# Patient Record
Sex: MALE | Race: WHITE | Employment: FULL TIME | URBAN - METROPOLITAN AREA
[De-identification: names, ages, dates, MRNs, and addresses within clinical notes are randomized per-mention and may not be internally consistent; named-entity substitution may affect disease eponyms.]

---

## 2019-07-27 ENCOUNTER — HOSPITAL ENCOUNTER (EMERGENCY)
Age: 54
Discharge: HOME | End: 2019-07-27
Payer: COMMERCIAL

## 2019-07-27 VITALS
RESPIRATION RATE: 16 BRPM | HEART RATE: 98 BPM | TEMPERATURE: 97.6 F | HEIGHT: 72 IN | BODY MASS INDEX: 33.12 KG/M2 | DIASTOLIC BLOOD PRESSURE: 97 MMHG | WEIGHT: 244.49 LBS | SYSTOLIC BLOOD PRESSURE: 167 MMHG | OXYGEN SATURATION: 98 %

## 2019-07-27 DIAGNOSIS — L03.115 CELLULITIS OF RIGHT LOWER EXTREMITY: Primary | ICD-10-CM

## 2019-07-27 PROCEDURE — 6370000000 HC RX 637 (ALT 250 FOR IP): Performed by: NURSE PRACTITIONER

## 2019-07-27 PROCEDURE — 99283 EMERGENCY DEPT VISIT LOW MDM: CPT

## 2019-07-27 PROCEDURE — 96372 THER/PROPH/DIAG INJ SC/IM: CPT

## 2019-07-27 PROCEDURE — 6360000002 HC RX W HCPCS: Performed by: NURSE PRACTITIONER

## 2019-07-27 RX ORDER — CLINDAMYCIN HYDROCHLORIDE 150 MG/1
450 CAPSULE ORAL 3 TIMES DAILY
Qty: 90 CAPSULE | Refills: 0 | Status: SHIPPED | OUTPATIENT
Start: 2019-07-27 | End: 2019-08-06

## 2019-07-27 RX ORDER — CLINDAMYCIN HYDROCHLORIDE 150 MG/1
450 CAPSULE ORAL ONCE
Status: COMPLETED | OUTPATIENT
Start: 2019-07-27 | End: 2019-07-27

## 2019-07-27 RX ORDER — CEFTRIAXONE 1 G/1
1 INJECTION, POWDER, FOR SOLUTION INTRAMUSCULAR; INTRAVENOUS ONCE
Status: COMPLETED | OUTPATIENT
Start: 2019-07-27 | End: 2019-07-27

## 2019-07-27 RX ADMIN — CLINDAMYCIN HYDROCHLORIDE 450 MG: 150 CAPSULE ORAL at 21:35

## 2019-07-27 RX ADMIN — CEFTRIAXONE 1 G: 1 INJECTION, POWDER, FOR SOLUTION INTRAMUSCULAR; INTRAVENOUS at 21:35

## 2019-07-27 ASSESSMENT — PAIN SCALES - GENERAL: PAINLEVEL_OUTOF10: 0

## 2019-07-27 ASSESSMENT — ENCOUNTER SYMPTOMS: COLOR CHANGE: 1

## 2019-07-28 NOTE — ED PROVIDER NOTES
**EVALUATED BY ADVANCED PRACTICE PROVIDER**        [address] [facility]      Pt Name: Shanthi Edwards  Y:[id number]  [place] 1965  Date of evaluation: 7/27/2019  Provider: TERESA Leigh CNP      Chief Complaint:    Chief Complaint   Patient presents with   [address]     right leg pt states that he was outside area is red        Nursing Notes, Past Medical Hx, Past Surgical Hx, Social Hx, Allergies, and Family Hx were all reviewed and agreed with or any disagreements were addressed in the HPI.    HPI:  (Location, Duration, Timing, Severity,Quality, Assoc Sx, Context, Modifying factors)  This is a  48 y.o. male who presents to the emergency department today complaining of redness and warmth to the front of his right calf. He states he had cellulitis in the past.  First started yesterday. No pain. No fever. No calf swelling. No chest pain or shortness of breath. PastMedical/Surgical History:  No past medical history on file. No past surgical history on file. Medications:  Previous Medications    ATORVASTATIN (LIPITOR) 10 MG TABLET        LISINOPRIL (PRINIVIL;ZESTRIL) 5 MG TABLET        METFORMIN (GLUCOPHAGE) 1000 MG TABLET             Review of Systems:  Review of Systems   Constitutional: Negative for chills, diaphoresis and fever. Musculoskeletal: Negative. Skin: Positive for color change. Allergic/Immunologic: Negative for immunocompromised state. Neurological: Negative for weakness and numbness. Hematological: Negative for adenopathy. Psychiatric/Behavioral: Negative for confusion. All other systems reviewed and are negative. Positives and Pertinent negatives as per HPI. Except as noted above in the ROS, problem specific ROS was completed and is negative. Physical Exam:  Physical Exam   Constitutional: He is oriented to person, place, and time.  Vital signs are normal. He appears well-developed and MEDICATIONS:  Discontinued Medications    No medications on file              (Please note the MDM and HPI sections of this note were completed with a voice recognition program.  Efforts weremade to edit the dictations but occasionally words are mis-transcribed.)    Electronically signed, TERESA Oswald CNP,           TERESA Oswald CNP  07/27/19 1036

## 2020-10-31 ENCOUNTER — HOSPITAL ENCOUNTER (EMERGENCY)
Age: 55
Discharge: HOME | End: 2020-10-31
Payer: COMMERCIAL

## 2020-10-31 VITALS
TEMPERATURE: 97.8 F | HEIGHT: 72 IN | WEIGHT: 235 LBS | SYSTOLIC BLOOD PRESSURE: 131 MMHG | HEART RATE: 104 BPM | RESPIRATION RATE: 16 BRPM | DIASTOLIC BLOOD PRESSURE: 88 MMHG | OXYGEN SATURATION: 100 % | BODY MASS INDEX: 31.83 KG/M2

## 2020-10-31 PROCEDURE — 99282 EMERGENCY DEPT VISIT SF MDM: CPT

## 2020-10-31 RX ORDER — SULFAMETHOXAZOLE AND TRIMETHOPRIM 800; 160 MG/1; MG/1
1 TABLET ORAL 2 TIMES DAILY
Qty: 20 TABLET | Refills: 0 | Status: SHIPPED | OUTPATIENT
Start: 2020-10-31 | End: 2020-11-10

## 2020-10-31 SDOH — HEALTH STABILITY: MENTAL HEALTH: HOW OFTEN DO YOU HAVE A DRINK CONTAINING ALCOHOL?: NEVER

## 2020-10-31 NOTE — ED PROVIDER NOTES
[address]       Pt Name: Shaneka Calhoun  MRN: [id number]  [place] 1965  Date of evaluation: 10/31/2020  Provider: LILIAN Yen    The ED Attending Physician was available for consultation but did not see or evaluate this patient. CHIEF COMPLAINT       Chief Complaint   Patient presents with    Cellulitis     cellulitis in R lower leg that started yesterday. HISTORY OF PRESENT ILLNESS  (Location/Symptom, Timing/Onset, Context/Setting, Quality, Duration, Modifying Factors, Severity.)   Shaneka Calhoun is a 50 y.o. male who presents to the emergency department with an suspicion of cellulitis in the right lower leg. He says that yesterday he began to have some redness and warmth in the front of the lower leg yesterday, no recent trauma, and is somewhat worse today. Not significantly painful. Denies any numbness. Says he has diabetes that he believes is well controlled, no insulin use. He reports that he has had cellulitis in the leg before, about a year ago, and then again a couple months ago, each time treated with an antibiotic and then went away. Says he was feeling a little rundown yesterday but otherwise normal feelings of general illness, no fever or other recent infections. Denies any other relevant medical problems. No other complaints. Nursing Notes were reviewed and I agree. REVIEW OF SYSTEMS    (2-9 systems for level 4, 10 or more for level 5)     Constitutional:  Negative for fever, chills. Respiratory:  Negative for cough, shortness of breath. Cardiovascular:  Negative for chest pain, palpitations. Gastrointestinal:  Negative for nausea, vomiting, abdominal pain. Genitourinary:  Negative for dysuria, hematuria, flank pain, and pelvic pain. Musculoskeletal:  Negative for myalgias, arthralgias, neck pain and neck stiffness. Skin: Positive for redness and warmth in the anterior right lower leg.   Neurological: Negative for dizziness, focal weakness, numbness. Except as noted above the remainder of the review of systems was reviewed and negative. PAST MEDICAL HISTORY         Diagnosis Date    Cellulitis     Diabetes mellitus ([address]        SURGICAL HISTORY     History reviewed. No pertinent surgical history. CURRENT MEDICATIONS       Previous Medications    ATORVASTATIN (LIPITOR) 10 MG TABLET        LISINOPRIL (PRINIVIL;ZESTRIL) 5 MG TABLET        METFORMIN (GLUCOPHAGE) 1000 MG TABLET           ALLERGIES     Patient has no known allergies. FAMILY HISTORY     History reviewed. No pertinent family history. No family status information on file. SOCIAL HISTORY      reports that he has never smoked. He has never used smokeless tobacco. He reports that he does not drink alcohol or use drugs. PHYSICAL EXAM    (up to 7 for level 4, 8 or more for level 5)     ED Triage Vitals [10/31/20 0916]   BP Temp Temp Source Pulse Resp SpO2 Height Weight   137/85 97.8 °F (36.6 °C) Oral 103 14 100 % 6' (1.829 m) 235 lb (106.6 kg)       Constitutional:  Appearing well-developed and well-nourished. No distress. HENT:  Normocephalic and atraumatic. Cardiovascular:  Normal rate, regular rhythm, normal heart sounds and intact distal pulses. Pulmonary/Chest:  Effort normal and breath sounds normal. No respiratory distress. Musculoskeletal:  Normal range of motion. No edema exhibited. Neurological:  Oriented to person, place, and time. No cranial nerve deficit. Skin:  Skin is warm and dry. Not diaphoretic. Erythema and warmth noted to the anterior right lower leg, not circumferential, negative for tenderness to palpation, negative for involvement of the foot, ankle or knee. See image below. Psychiatric:  Normal mood, affect, behavior, judgment and thought content.            DIAGNOSTIC RESULTS     RADIOLOGY:   Non-plain film images such as CT, Ultrasound and MRI are read by the radiologist. Plain radiographic images are visualized and preliminarily interpreted by ILLIAN Schneider with the below findings:    None. Interpretation per the Radiologist below, if available at the time of this note:    No orders to display       LABS:  Labs Reviewed - No data to display    All other labs were within normal range or not returned as of this dictation. EMERGENCY DEPARTMENT COURSE and DIFFERENTIAL DIAGNOSIS/MDM:   Vitals:    Vitals:    10/31/20 0916   BP: 137/85   Pulse: 103   Resp: 14   Temp: 97.8 °F (36.6 °C)   TempSrc: Oral   SpO2: 100%   Weight: 235 lb (106.6 kg)   Height: 6' (1.829 m)       The patient's condition in the ED was good, the patient was afebrile and nontoxic in appearance, and the patient's physical exam was unremarkable other than for the right lower leg findings noted above, suggesting cellulitis. Patient presented with a heart rate of 103 but his heart rate normalized in the ED without intervention. Suspicion for sepsis or severe systemic infection was low. Patient is a known diabetic, not insulin-dependent. Has been treated for the cellulitis, apparently successfully, twice before. There was no indication for hospitalization or further workup. We discharged with a prescription for a course of Bactrim treatment, and he says he has an appointment with primary care in mid November. Was advised to keep this appointment or call sooner if needed, and to return to the emergency department if symptoms worsen. The patient verbalized understanding and agreement with this plan of care. The patient was advised to return to the emergency department if symptoms should significantly worsen or if new and concerning symptoms should appear. I estimate there is LOW risk for SEVERE CELLULITIS, SEPSIS NECROTIZING FASCIITIS, FRACTURE, COMPARTMENT SYNDROME, DEEP VENOUS THROMBOSIS, SEPTIC ARTHRITIS, TENDON OR NEUROVASCULAR INJURY, thus I consider the discharge disposition reasonable.     PROCEDURES:  None    FINAL IMPRESSION      1.  Cellulitis of left lower leg          DISPOSITION/PLAN   DISPOSITION Decision To Discharge 10/31/2020 09:35:15 AM      PATIENT REFERRED TO:  your family doctor or primary care provider    Go to   Keep your upcoming appointment, or call sooner if needed    [facility] Emergency Department  [address] [facility]  [phone]  Go to   If symptoms worsen      DISCHARGE MEDICATIONS:  New Prescriptions    SULFAMETHOXAZOLE-TRIMETHOPRIM (BACTRIM DS) 800-160 MG PER TABLET    Take 1 tablet by mouth 2 times daily for 10 days       (Please note that portions of this note were completed with a voice recognition program.  Efforts were made to edit the dictations but occasionally words are mis-transcribed.)    Glynn Sal, [address] [place] [address]  10/31/20 0231

## 2020-10-31 NOTE — ED NOTES
Pt states that this morning he noticed the right lower leg become slightly red and warm, he does have a history of cellulitis. NO breaking of the skin, denies any pain.      Jenna Del Cid RN  10/31/20 3426

## 2021-12-27 PROCEDURE — 99283 EMERGENCY DEPT VISIT LOW MDM: CPT

## 2021-12-27 ASSESSMENT — PAIN DESCRIPTION - LOCATION: LOCATION: LEG

## 2021-12-27 ASSESSMENT — PAIN DESCRIPTION - DESCRIPTORS: DESCRIPTORS: ACHING

## 2021-12-27 ASSESSMENT — PAIN DESCRIPTION - PAIN TYPE: TYPE: ACUTE PAIN

## 2021-12-27 ASSESSMENT — PAIN DESCRIPTION - FREQUENCY: FREQUENCY: CONTINUOUS

## 2021-12-27 ASSESSMENT — PAIN SCALES - GENERAL: PAINLEVEL_OUTOF10: 2

## 2021-12-27 ASSESSMENT — PAIN SCALES - WONG BAKER: WONGBAKER_NUMERICALRESPONSE: 2

## 2021-12-27 ASSESSMENT — PAIN DESCRIPTION - ORIENTATION: ORIENTATION: RIGHT

## 2021-12-28 ENCOUNTER — APPOINTMENT (OUTPATIENT)
Dept: ULTRASOUND IMAGING | Age: 56
End: 2021-12-28
Payer: COMMERCIAL

## 2021-12-28 ENCOUNTER — HOSPITAL ENCOUNTER (OUTPATIENT)
Age: 56
Setting detail: OBSERVATION
Discharge: HOME | End: 2021-12-29
Attending: STUDENT IN AN ORGANIZED HEALTH CARE EDUCATION/TRAINING PROGRAM | Admitting: STUDENT IN AN ORGANIZED HEALTH CARE EDUCATION/TRAINING PROGRAM
Payer: COMMERCIAL

## 2021-12-28 DIAGNOSIS — A41.9 SEPSIS DUE TO CELLULITIS (HCC): Primary | ICD-10-CM

## 2021-12-28 DIAGNOSIS — L03.90 SEPSIS DUE TO CELLULITIS (HCC): Primary | ICD-10-CM

## 2021-12-28 PROBLEM — E11.9 DM2 (DIABETES MELLITUS, TYPE 2) (HCC): Status: ACTIVE | Noted: 2021-12-28

## 2021-12-28 LAB
ANION GAP SERPL CALCULATED.3IONS-SCNC: 16 MMOL/L (ref 3–16)
ANION GAP SERPL CALCULATED.3IONS-SCNC: 16 MMOL/L (ref 3–16)
BASOPHILS ABSOLUTE: 0 K/UL (ref 0–0.2)
BASOPHILS ABSOLUTE: 0 K/UL (ref 0–0.2)
BASOPHILS RELATIVE PERCENT: 0.1 %
BASOPHILS RELATIVE PERCENT: 0.2 %
BUN BLDV-MCNC: 15 MG/DL (ref 7–20)
BUN BLDV-MCNC: 17 MG/DL (ref 7–20)
CALCIUM SERPL-MCNC: 8 MG/DL (ref 8.3–10.6)
CALCIUM SERPL-MCNC: 9 MG/DL (ref 8.3–10.6)
CHLORIDE BLD-SCNC: 102 MMOL/L (ref 99–110)
CHLORIDE BLD-SCNC: 99 MMOL/L (ref 99–110)
CO2: 16 MMOL/L (ref 21–32)
CO2: 17 MMOL/L (ref 21–32)
CREAT SERPL-MCNC: 0.6 MG/DL (ref 0.9–1.3)
CREAT SERPL-MCNC: 0.7 MG/DL (ref 0.9–1.3)
EOSINOPHILS ABSOLUTE: 0 K/UL (ref 0–0.6)
EOSINOPHILS ABSOLUTE: 0 K/UL (ref 0–0.6)
EOSINOPHILS RELATIVE PERCENT: 0 %
EOSINOPHILS RELATIVE PERCENT: 0 %
ESTIMATED AVERAGE GLUCOSE: 145.6 MG/DL
GFR AFRICAN AMERICAN: >60
GFR AFRICAN AMERICAN: >60
GFR NON-AFRICAN AMERICAN: >60
GFR NON-AFRICAN AMERICAN: >60
GLUCOSE BLD-MCNC: 110 MG/DL (ref 70–99)
GLUCOSE BLD-MCNC: 151 MG/DL (ref 70–99)
GLUCOSE BLD-MCNC: 182 MG/DL (ref 70–99)
GLUCOSE BLD-MCNC: 195 MG/DL (ref 70–99)
GLUCOSE BLD-MCNC: 201 MG/DL (ref 70–99)
GLUCOSE BLD-MCNC: 219 MG/DL (ref 70–99)
HBA1C MFR BLD: 6.7 %
HCT VFR BLD CALC: 40.7 % (ref 40.5–52.5)
HCT VFR BLD CALC: 47.9 % (ref 40.5–52.5)
HEMOGLOBIN: 14.3 G/DL (ref 13.5–17.5)
HEMOGLOBIN: 15.8 G/DL (ref 13.5–17.5)
LACTIC ACID, SEPSIS: 1.3 MMOL/L (ref 0.4–1.9)
LACTIC ACID, SEPSIS: 1.4 MMOL/L (ref 0.4–1.9)
LYMPHOCYTES ABSOLUTE: 0.3 K/UL (ref 1–5.1)
LYMPHOCYTES ABSOLUTE: 0.5 K/UL (ref 1–5.1)
LYMPHOCYTES RELATIVE PERCENT: 2 %
LYMPHOCYTES RELATIVE PERCENT: 4.3 %
MAGNESIUM: 1.7 MG/DL (ref 1.8–2.4)
MCH RBC QN AUTO: 30.4 PG (ref 26–34)
MCH RBC QN AUTO: 31.8 PG (ref 26–34)
MCHC RBC AUTO-ENTMCNC: 33 G/DL (ref 31–36)
MCHC RBC AUTO-ENTMCNC: 35 G/DL (ref 31–36)
MCV RBC AUTO: 90.8 FL (ref 80–100)
MCV RBC AUTO: 92.2 FL (ref 80–100)
MONOCYTES ABSOLUTE: 0.7 K/UL (ref 0–1.3)
MONOCYTES ABSOLUTE: 1 K/UL (ref 0–1.3)
MONOCYTES RELATIVE PERCENT: 5.4 %
MONOCYTES RELATIVE PERCENT: 6.3 %
NEUTROPHILS ABSOLUTE: 11.4 K/UL (ref 1.7–7.7)
NEUTROPHILS ABSOLUTE: 14.5 K/UL (ref 1.7–7.7)
NEUTROPHILS RELATIVE PERCENT: 90.1 %
NEUTROPHILS RELATIVE PERCENT: 91.6 %
PDW BLD-RTO: 13 % (ref 12.4–15.4)
PDW BLD-RTO: 13.3 % (ref 12.4–15.4)
PERFORMED ON: ABNORMAL
PLATELET # BLD: 147 K/UL (ref 135–450)
PLATELET # BLD: 151 K/UL (ref 135–450)
PMV BLD AUTO: 7.5 FL (ref 5–10.5)
PMV BLD AUTO: 7.6 FL (ref 5–10.5)
POTASSIUM REFLEX MAGNESIUM: 3.9 MMOL/L (ref 3.5–5.1)
POTASSIUM SERPL-SCNC: 3.7 MMOL/L (ref 3.5–5.1)
RBC # BLD: 4.48 M/UL (ref 4.2–5.9)
RBC # BLD: 5.19 M/UL (ref 4.2–5.9)
SODIUM BLD-SCNC: 132 MMOL/L (ref 136–145)
SODIUM BLD-SCNC: 134 MMOL/L (ref 136–145)
WBC # BLD: 12.7 K/UL (ref 4–11)
WBC # BLD: 15.8 K/UL (ref 4–11)

## 2021-12-28 PROCEDURE — 83036 HEMOGLOBIN GLYCOSYLATED A1C: CPT

## 2021-12-28 PROCEDURE — 87040 BLOOD CULTURE FOR BACTERIA: CPT

## 2021-12-28 PROCEDURE — 96372 THER/PROPH/DIAG INJ SC/IM: CPT

## 2021-12-28 PROCEDURE — 6370000000 HC RX 637 (ALT 250 FOR IP): Performed by: FAMILY MEDICINE

## 2021-12-28 PROCEDURE — 94760 N-INVAS EAR/PLS OXIMETRY 1: CPT

## 2021-12-28 PROCEDURE — 6360000002 HC RX W HCPCS: Performed by: FAMILY MEDICINE

## 2021-12-28 PROCEDURE — 96366 THER/PROPH/DIAG IV INF ADDON: CPT

## 2021-12-28 PROCEDURE — 83605 ASSAY OF LACTIC ACID: CPT

## 2021-12-28 PROCEDURE — 6360000002 HC RX W HCPCS: Performed by: STUDENT IN AN ORGANIZED HEALTH CARE EDUCATION/TRAINING PROGRAM

## 2021-12-28 PROCEDURE — 85025 COMPLETE CBC W/AUTO DIFF WBC: CPT

## 2021-12-28 PROCEDURE — 80048 BASIC METABOLIC PNL TOTAL CA: CPT

## 2021-12-28 PROCEDURE — 96365 THER/PROPH/DIAG IV INF INIT: CPT

## 2021-12-28 PROCEDURE — 2580000003 HC RX 258: Performed by: STUDENT IN AN ORGANIZED HEALTH CARE EDUCATION/TRAINING PROGRAM

## 2021-12-28 PROCEDURE — 36415 COLL VENOUS BLD VENIPUNCTURE: CPT

## 2021-12-28 PROCEDURE — G0378 HOSPITAL OBSERVATION PER HR: HCPCS

## 2021-12-28 PROCEDURE — 6360000002 HC RX W HCPCS: Performed by: PHYSICIAN ASSISTANT

## 2021-12-28 PROCEDURE — 96367 TX/PROPH/DG ADDL SEQ IV INF: CPT

## 2021-12-28 PROCEDURE — 76999 ECHO EXAMINATION PROCEDURE: CPT

## 2021-12-28 PROCEDURE — 6370000000 HC RX 637 (ALT 250 FOR IP): Performed by: STUDENT IN AN ORGANIZED HEALTH CARE EDUCATION/TRAINING PROGRAM

## 2021-12-28 PROCEDURE — 2580000003 HC RX 258: Performed by: PHYSICIAN ASSISTANT

## 2021-12-28 PROCEDURE — 83735 ASSAY OF MAGNESIUM: CPT

## 2021-12-28 RX ORDER — ASPIRIN 81 MG/1
81 TABLET ORAL DAILY
Status: DISCONTINUED | OUTPATIENT
Start: 2021-12-28 | End: 2021-12-29 | Stop reason: HOSPADM

## 2021-12-28 RX ORDER — ASPIRIN 81 MG/1
81 TABLET ORAL DAILY
COMMUNITY

## 2021-12-28 RX ORDER — DEXTROSE MONOHYDRATE 50 MG/ML
100 INJECTION, SOLUTION INTRAVENOUS PRN
Status: DISCONTINUED | OUTPATIENT
Start: 2021-12-28 | End: 2021-12-29 | Stop reason: HOSPADM

## 2021-12-28 RX ORDER — EMPAGLIFLOZIN 25 MG/1
25 TABLET, FILM COATED ORAL DAILY
COMMUNITY

## 2021-12-28 RX ORDER — ACETAMINOPHEN 325 MG/1
650 TABLET ORAL EVERY 6 HOURS PRN
Status: DISCONTINUED | OUTPATIENT
Start: 2021-12-28 | End: 2021-12-29 | Stop reason: HOSPADM

## 2021-12-28 RX ORDER — ONDANSETRON 2 MG/ML
4 INJECTION INTRAMUSCULAR; INTRAVENOUS EVERY 6 HOURS PRN
Status: DISCONTINUED | OUTPATIENT
Start: 2021-12-28 | End: 2021-12-29 | Stop reason: HOSPADM

## 2021-12-28 RX ORDER — NICOTINE POLACRILEX 4 MG
15 LOZENGE BUCCAL PRN
Status: DISCONTINUED | OUTPATIENT
Start: 2021-12-28 | End: 2021-12-29 | Stop reason: HOSPADM

## 2021-12-28 RX ORDER — MAGNESIUM SULFATE IN WATER 40 MG/ML
2000 INJECTION, SOLUTION INTRAVENOUS ONCE
Status: COMPLETED | OUTPATIENT
Start: 2021-12-28 | End: 2021-12-28

## 2021-12-28 RX ORDER — SODIUM CHLORIDE 9 MG/ML
25 INJECTION, SOLUTION INTRAVENOUS PRN
Status: DISCONTINUED | OUTPATIENT
Start: 2021-12-28 | End: 2021-12-29 | Stop reason: HOSPADM

## 2021-12-28 RX ORDER — 0.9 % SODIUM CHLORIDE 0.9 %
30 INTRAVENOUS SOLUTION INTRAVENOUS ONCE
Status: COMPLETED | OUTPATIENT
Start: 2021-12-28 | End: 2021-12-28

## 2021-12-28 RX ORDER — SODIUM CHLORIDE 0.9 % (FLUSH) 0.9 %
5-40 SYRINGE (ML) INJECTION EVERY 12 HOURS SCHEDULED
Status: DISCONTINUED | OUTPATIENT
Start: 2021-12-28 | End: 2021-12-29 | Stop reason: HOSPADM

## 2021-12-28 RX ORDER — 0.9 % SODIUM CHLORIDE 0.9 %
1000 INTRAVENOUS SOLUTION INTRAVENOUS ONCE
Status: COMPLETED | OUTPATIENT
Start: 2021-12-28 | End: 2021-12-28

## 2021-12-28 RX ORDER — SODIUM CHLORIDE 0.9 % (FLUSH) 0.9 %
5-40 SYRINGE (ML) INJECTION PRN
Status: DISCONTINUED | OUTPATIENT
Start: 2021-12-28 | End: 2021-12-29 | Stop reason: HOSPADM

## 2021-12-28 RX ORDER — ACETAMINOPHEN 650 MG/1
650 SUPPOSITORY RECTAL EVERY 6 HOURS PRN
Status: DISCONTINUED | OUTPATIENT
Start: 2021-12-28 | End: 2021-12-29 | Stop reason: HOSPADM

## 2021-12-28 RX ORDER — DEXTROSE MONOHYDRATE 25 G/50ML
12.5 INJECTION, SOLUTION INTRAVENOUS PRN
Status: DISCONTINUED | OUTPATIENT
Start: 2021-12-28 | End: 2021-12-29 | Stop reason: HOSPADM

## 2021-12-28 RX ORDER — ATORVASTATIN CALCIUM 10 MG/1
10 TABLET, FILM COATED ORAL NIGHTLY
Status: DISCONTINUED | OUTPATIENT
Start: 2021-12-28 | End: 2021-12-29 | Stop reason: HOSPADM

## 2021-12-28 RX ORDER — LISINOPRIL 5 MG/1
5 TABLET ORAL DAILY
Status: DISCONTINUED | OUTPATIENT
Start: 2021-12-28 | End: 2021-12-29 | Stop reason: HOSPADM

## 2021-12-28 RX ADMIN — SODIUM CHLORIDE 3273 ML: 9 INJECTION, SOLUTION INTRAVENOUS at 02:54

## 2021-12-28 RX ADMIN — CEFEPIME HYDROCHLORIDE 2000 MG: 2 INJECTION, POWDER, FOR SOLUTION INTRAVENOUS at 02:54

## 2021-12-28 RX ADMIN — SODIUM CHLORIDE 25 ML: 9 INJECTION, SOLUTION INTRAVENOUS at 10:53

## 2021-12-28 RX ADMIN — CEFAZOLIN 2000 MG: 10 INJECTION, POWDER, FOR SOLUTION INTRAVENOUS at 18:07

## 2021-12-28 RX ADMIN — CEFAZOLIN 2000 MG: 10 INJECTION, POWDER, FOR SOLUTION INTRAVENOUS at 10:54

## 2021-12-28 RX ADMIN — Medication 1250 MG: at 20:38

## 2021-12-28 RX ADMIN — ASPIRIN 81 MG: 81 TABLET, COATED ORAL at 10:48

## 2021-12-28 RX ADMIN — SODIUM CHLORIDE 1000 ML: 9 INJECTION, SOLUTION INTRAVENOUS at 02:07

## 2021-12-28 RX ADMIN — SODIUM CHLORIDE 25 ML: 9 INJECTION, SOLUTION INTRAVENOUS at 12:16

## 2021-12-28 RX ADMIN — INSULIN LISPRO 4 UNITS: 100 INJECTION, SOLUTION INTRAVENOUS; SUBCUTANEOUS at 12:17

## 2021-12-28 RX ADMIN — MAGNESIUM SULFATE HEPTAHYDRATE 2000 MG: 40 INJECTION, SOLUTION INTRAVENOUS at 12:17

## 2021-12-28 RX ADMIN — SODIUM CHLORIDE, PRESERVATIVE FREE 10 ML: 5 INJECTION INTRAVENOUS at 09:18

## 2021-12-28 RX ADMIN — ATORVASTATIN CALCIUM 10 MG: 10 TABLET, FILM COATED ORAL at 20:38

## 2021-12-28 RX ADMIN — SODIUM CHLORIDE 25 ML: 9 INJECTION, SOLUTION INTRAVENOUS at 18:04

## 2021-12-28 RX ADMIN — INSULIN LISPRO 2 UNITS: 100 INJECTION, SOLUTION INTRAVENOUS; SUBCUTANEOUS at 18:08

## 2021-12-28 RX ADMIN — LISINOPRIL 5 MG: 5 TABLET ORAL at 09:18

## 2021-12-28 RX ADMIN — VANCOMYCIN HYDROCHLORIDE 2000 MG: 1 INJECTION, POWDER, LYOPHILIZED, FOR SOLUTION INTRAVENOUS at 04:37

## 2021-12-28 RX ADMIN — ENOXAPARIN SODIUM 40 MG: 100 INJECTION SUBCUTANEOUS at 09:18

## 2021-12-28 ASSESSMENT — PAIN SCALES - WONG BAKER
WONGBAKER_NUMERICALRESPONSE: 2
WONGBAKER_NUMERICALRESPONSE: 2

## 2021-12-28 ASSESSMENT — PAIN SCALES - GENERAL
PAINLEVEL_OUTOF10: 0
PAINLEVEL_OUTOF10: 2
PAINLEVEL_OUTOF10: 2

## 2021-12-28 NOTE — ED TRIAGE NOTES
Becca Blount is a 49 y.o. male brought himself to the ER for eval of cellulitis in right lower leg. The patient states that he has a history of cellulitis and noticed that his leg was red today. His wife outlined the redness and it continued to get worse as the night continued. The patient is alert and oriented with an open and patent airway.

## 2021-12-28 NOTE — PROGRESS NOTES
Pt S/E. Pt was admitted overnight with rle cellulitis.   H&P noted and agree with the plan  Labs noted  Change abx to vanc, ancef as he has no risk factors for pseudomonas    Cont jardiance for dm, hold metformin  Ssi, ccd  Check hba1c  Replace Mg  Follow up on us

## 2021-12-28 NOTE — H&P
Hospital Medicine History & Physical      PCP: Bailey Guillory    Date of Admission: 12/28/2021    Date of Service: Pt seen/examined on 12/28/2021 and  Placed in Observation. Chief Complaint: Right lower extremity erythema and warmth, lightheadedness, chills      History Of Present Illness: The patient is a 49 y.o. male with past medical history of hypertension, hyperlipidemia, and diabetes who presents to [facility] with concern for worsening right lower extremity erythema and warmth that began without any notable trigger since the last 24 to 36 hours. Patient notes that yesterday morning he started noticing some slight erythema to the right lower extremity but had not been seeking any major medical attention until later. It was noted that prior to the erythema becoming worse his wife had outlined the initial erythema to continue to monitor it. As the rest of the next 24 hours went on patient and wife noted that the erythema was worsening. He also admits to having some slight level of lightheadedness and generalized weakness as well as some chills throughout the rest of the day. He did feel as though the infection was getting worse. He denies any areas of drainage to the right lower extremity. No other areas of erythema are noted throughout the rest of his body according to his report. He denies other symptoms of dizziness, syncope, chest pain, shortness of breath, dysuria, blood in urine/stool/sputum, abdominal pain/nausea/vomiting/diarrhea. Past Medical History:        Diagnosis Date    Cellulitis     Diabetes mellitus ([facility] [address]        Past Surgical History:    History reviewed. No pertinent surgical history. Medications Prior to Admission:    Prior to Admission medications    Medication Sig Start Date End Date Taking?  Authorizing Provider empagliflozin (JARDIANCE) 25 MG tablet Take 25 mg by mouth daily   Yes Historical Provider, MD   aspirin EC 81 MG EC tablet Take 81 mg by mouth daily   Yes Historical Provider, MD   metFORMIN (GLUCOPHAGE) 1000 MG tablet Take 1,000 mg by mouth 2 times daily  11/29/15  Yes Historical Provider, MD   lisinopril (PRINIVIL;ZESTRIL) 5 MG tablet  11/29/15  Yes Historical Provider, MD   atorvastatin (LIPITOR) 10 MG tablet  11/29/15  Yes Historical Provider, MD       Allergies:  Patient has no known allergies. Social History:  The patient currently lives home    TOBACCO:   reports that he has never smoked. He has never used smokeless tobacco.  ETOH:   reports no history of alcohol use. Family History:  Reviewed in detail and negative for DM, Early CAD, Cancer, CVA. Positive as follows:    History reviewed. No pertinent family history. REVIEW OF SYSTEMS:   as noted in the HPI. All other systems reviewed and negative. PHYSICAL EXAM:    BP (!) 155/62   Pulse 98   Temp 99.4 °F (37.4 °C) (Oral)   Resp 18   Ht 6' (1.829 m)   Wt 240 lb 8.4 oz (109.1 kg)   SpO2 96%   BMI 32.62 kg/m²     General appearance: No apparent distress appears stated age and cooperative. HEENT Normal cephalic, atraumatic without obvious deformity. Pupils equal, round, and reactive to light. Extra ocular muscles intact. Conjunctivae/corneas clear. Neck: Supple, No jugular venous distention/bruits. Trachea midline without thyromegaly or adenopathy with full range of motion. Lungs: Clear to auscultation, bilaterally without Rales/Wheezes/Rhonchi with good respiratory effort. Heart: Regular rate and rhythm with Normal S1/S2 without murmurs, rubs or gallops, point of maximum impulse non-displaced  Abdomen: Soft, non-tender or non-distended without rigidity or guarding and positive bowel sounds all four quadrants.   Extremities: Right lower extremity demonstrates a very large amorphous area of erythema and warmth, none of this is seen on the left lower extremity, no areas of drainage are noted to the right lower extremity suggest an abscess at this time  Skin: As noted above, otherwise no other rashes  Neurologic: Alert and oriented X 3, neurovascularly intact with sensory/motor intact upper extremities/lower extremities, bilaterally. Cranial nerves: II-XII intact, grossly non-focal.  Mental status: Alert, oriented, thought content appropriate. Capillary Refill: Acceptable  < 3 seconds  Peripheral Pulses: +3 Easily felt, not easily obliterated with pressure        CBC   Recent Labs     12/28/21  0134 12/28/21  0604   WBC 15.8* 12.7*   HGB 15.8 14.3   HCT 47.9 40.7    147      RENAL  Recent Labs     12/28/21  0134   *   K 3.9   CL 99   CO2 17*   BUN 17   CREATININE 0.6*     LFT'S  No results for input(s): AST, ALT, ALB, BILIDIR, BILITOT, ALKPHOS in the last 72 hours. COAG  No results for input(s): INR in the last 72 hours. CARDIAC ENZYMES  No results for input(s): CKTOTAL, CKMB, CKMBINDEX, TROPONINI in the last 72 hours.     U/A:  No results found for: NITRITE, COLORU, WBCUA, RBCUA, MUCUS, BACTERIA, CLARITYU, SPECGRAV, LEUKOCYTESUR, BLOODU, GLUCOSEU, AMORPHOUS    ABG  No results found for: OPD4ETO, BEART, E1MTAJLC, PHART, THGBART, LTA6UDS, PO2ART, JFB8MGW        Active Hospital Problems    Diagnosis Date Noted    Cellulitis [L03.90] 12/28/2021    Sepsis due to cellulitis ([facility] [address] [L03.90, A41.9] 12/28/2021    DM2 (diabetes mellitus, type 2) ([facility] [address] [E11.9] 12/28/2021         PHYSICIANS CERTIFICATION:    I certify that Sade Gustafson is expected to be hospitalized for last than 2 midnights based on the following assessment and plan:      ASSESSMENT/PLAN:  · Sepsis  · Cellulitis  · Type 2 diabetes    Plan:  · Patient started on vancomycin and cefepime for broad-spectrum coverage of cellulitis to the right lower extremity  · Obtaining ultrasound of the right lower extremity erythema to evaluate for abscess  · Sliding-scale insulin coverage  · Restart patient's home medications  · Repeat labs in the morning        DVT Prophylaxis: Lovenox  Diet: ADULT DIET; Regular; 4 carb choices (60 gm/meal); Low Sodium (2 gm)  Code Status: Full Code  PT/OT Eval Status: Ambulatory    Dispo -pending clinical course       Regulo Gan DO    Thank you Kim Claire for the opportunity to be involved in this patient's care. If you have any questions or concerns please feel free to contact me at [phone].

## 2021-12-28 NOTE — PROGRESS NOTES
Patient resting quietly in bed. Alert and oriented. Denies pain. Breakfast ordered. Morning medications given without difficulty. VSS. Patient ambulates independently. Cellulitis marked on RLE. Call light and belongings within reach. Will continue to monitor.      Electronically signed by Nayeli Price RN on 12/28/2021 at 11:30 AM

## 2021-12-28 NOTE — PROGRESS NOTES
4 Eyes Admission Assessment     I agree as the admission nurse that 2 RN's have performed a thorough Head to Toe Skin Assessment on the patient. ALL assessment sites listed below have been assessed on admission. Areas assessed by both nurses:   [x]   Head, Face, and Ears   [x]   Shoulders, Back, and Chest  [x]   Arms, Elbows, and Hands   [x]   Coccyx, Sacrum, and Ischum  [x]   Legs, Feet, and Heels  Cellulitis redness to right lower leg      Does the Patient have Skin Breakdown?   No         Yony Prevention initiated:  No   Wound Care Orders initiated:  No      [facility] nurse consulted for Pressure Injury (Stage 3,4, Unstageable, DTI, NWPT, and Complex wounds):  No      Nurse 1 eSignature: Electronically signed by Missy Cutler RN on 12/28/21 at 7:32 AM EST    **SHARE this note so that the co-signing nurse is able to place an eSignature**    Nurse 2 eSignature: Electronically signed by Siria Hernandez RN on 12/28/21 at 7:33 AM EST

## 2021-12-28 NOTE — PLAN OF CARE
Problem: Pain:  Goal: Pain level will decrease  Description: Pain level will decrease  Outcome: Ongoing  Note: Educated patient on pain management. Will assess patients pain level per unit protocol, and provide pain management measures as needed.    Electronically signed by Mera Olmedo RN on 12/28/2021 at 11:27 AM

## 2021-12-28 NOTE — ED PROVIDER NOTES
[facility] Emergency Department    CHIEF COMPLAINT  Cellulitis (right leg)      SHARED SERVICE VISIT  Evaluated by ANTONIO. My supervising physician was available for consultation. HISTORY OF PRESENT ILLNESS  Dolly Oshea is a 49 y.o. male history of diabetes as well as recurrent cellulitis of the lower extremity presents emergency department for evaluation of cellulitis that began this afternoon. Patient states that around 6 PM he circled the area of redness and had however double since that time. Patient reports having cellulitis at the same area in the past however no recent antibiotics. No fevers or chills. No chest pain difficulty breathing. No other complaints, modifying factors or associated symptoms. Nursing notes reviewed. Past Medical History:   Diagnosis Date    Cellulitis     Diabetes mellitus ([facility] [address]      History reviewed. No pertinent surgical history. History reviewed. No pertinent family history. Social History     Socioeconomic History    Marital status:      Spouse name: Not on file    Number of children: Not on file    Years of education: Not on file    Highest education level: Not on file   Occupational History    Not on file   Tobacco Use    Smoking status: Never Smoker    Smokeless tobacco: Never Used   Vaping Use    Vaping Use: Not on file   Substance and Sexual Activity    Alcohol use: Never    Drug use: Never    Sexual activity: Not on file   Other Topics Concern    Not on file   Social History Narrative    Not on file     Social Determinants of Health     Financial Resource Strain:     Difficulty of Paying Living Expenses: Not on file   Food Insecurity:     Worried About [address] in the Last Year: Not on file    Solomon of Food in the Last Year: Not on file   Transportation Needs:     Lack of Transportation (Medical): Not on file    Lack of Transportation (Non-Medical):  Not on file   Physical Activity:     Days of Exercise per Week: Not on file    Minutes of Exercise per Session: Not on file   Stress:     Feeling of Stress : Not on file   Social Connections:     Frequency of Communication with Friends and Family: Not on file    Frequency of Social Gatherings with Friends and Family: Not on file    Attends [employer] Services: Not on file    Active Member of [address] [place] [address] or Organizations: Not on file    Attends Club or Organization Meetings: Not on file    Marital Status: Not on file   Intimate Partner Violence:     Fear of Current or Ex-Partner: Not on file    Emotionally Abused: Not on file    Physically Abused: Not on file    Sexually Abused: Not on file   Housing Stability:     Unable to Pay for Housing in the Last Year: Not on file    Number of [place] in the Last Year: Not on file    Unstable Housing in the Last Year: Not on file     Current Facility-Administered Medications   Medication Dose Route Frequency Provider Last Rate Last Admin    sodium chloride flush 0.9 % injection 5-40 mL  5-40 mL IntraVENous 2 times per day [facility]   10 mL at 12/28/21 0918    sodium chloride flush 0.9 % injection 5-40 mL  5-40 mL IntraVENous PRN [facility], DO        0.9 % sodium chloride infusion  25 mL IntraVENous PRN [facility],  mL/hr at 12/28/21 1804 25 mL at 12/28/21 1804    enoxaparin (LOVENOX) injection 40 mg  40 mg SubCUTAneous Daily [facility]   40 mg at 12/28/21 0918    acetaminophen (TYLENOL) tablet 650 mg  650 mg Oral Q6H [facility]N [facility]        Or    acetaminophen (TYLENOL) suppository 650 mg  650 mg Rectal Q6H PRN [facility]        ondansetron (ZOFRAN) injection 4 mg  4 mg IntraVENous Q6H PRN [facility]        lisinopril (PRINIVIL;ZESTRIL) tablet 5 mg  5 mg Oral Daily [facility]   5 mg at 12/28/21 0918    atorvastatin (LIPITOR) tablet 10 mg  10 mg Oral Nightly [facility]        glucose (GLUTOSE) 40 % oral gel 15 g 15 g Oral PRN Regulo M Elvia, DO        dextrose 50 % IV solution  12.5 g IntraVENous PRN Regulo M [facility], DO        glucagon (rDNA) injection 1 mg  1 mg IntraMUSCular PRN Regulo M El[facility], DO        dextrose 5 % solution  100 mL/hr IntraVENous PRN Regulo M [facility], DO        vancomycin (VANCOCIN) 1250 mg in dextrose 5 % 250 mL IVPB  1,250 mg IntraVENous Q12H Regulo M Elvia, DO        vancomycin (VANCOCIN) intermittent dosing (placeholder)   Other RX Placeholder Regulo Pasty Mola, DO        ceFAZolin (ANCEF) 2000 mg in dextrose 5 % 100 mL IVPB  2,000 mg IntraVENous Q8H Veena R Hurst,  mL/hr at 12/28/21 1807 2,000 mg at 12/28/21 1807    aspirin EC tablet 81 mg  81 mg Oral Daily Veena R Hurst, DO   81 mg at 12/28/21 1048    empagliflozin (JARDIANCE) tablet TABS 25 mg  25 mg Oral Daily Veena R Hurst, DO   25 mg at 12/28/21 1801    insulin lispro (HUMALOG) injection vial 0-12 Units  0-12 Units SubCUTAneous TID WC Veena R Hurst, DO   4 Units at 12/28/21 1217    insulin lispro (HUMALOG) injection vial 0-6 Units  0-6 Units SubCUTAneous Nightly Veena R Hurst, DO         No Known Allergies    REVIEW OF SYSTEMS  10 systems reviewed, pertinent positives per HPI otherwise noted to be negative    PHYSICAL EXAM  /72   Pulse 102   Temp 99.4 °F (37.4 °C) (Oral)   Resp 13   Ht 6' (1.829 m)   Wt 240 lb 8.4 oz (109.1 kg)   SpO2 94%   BMI 32.62 kg/m²   GENERAL APPEARANCE: Awake and alert. Cooperative. HEAD: Normocephalic. Atraumatic. EYES: EOM's grossly intact. ENT: Mucous membranes are moist.   NECK: Supple. HEART: RRR. No murmurs. LUNGS: Respirations unlabored. CTAB. Good air exchange. Speaking comfortably in full sentences. EXTREMITIES: No peripheral edema. Moves all extremities equally. All extremities neurovascularly intact. See below  SKIN: Increased warmth and erythema expanding past the previously drawn david. No open wounds or disruption of the skin. NEUROLOGICAL: Alert and oriented.  CN's [facility] [place]ccarthy [place]    Phone [phone]   POCT GLUCOSE - Abnormal; Notable for the following components:    POC Glucose 182 (*)     All other components within normal limits    Narrative:     Performed at:  [address] [place]    Phone [phone]   POCT GLUCOSE - Abnormal; Notable for the following components:    POC Glucose 219 (*)     All other components within normal limits    Narrative:     Performed at:  [address] [place]    Phone [phone]   POCT GLUCOSE - Abnormal; Notable for the following components:    POC Glucose 151 (*)     All other components within normal limits    Narrative:     Performed at:  [address] [email or web address] [place]    Phone [phone]   CULTURE, BLOOD 1   CULTURE, BLOOD 2   LACTATE, SEPSIS    Narrative:     Performed at:  [address] [place] [place]    Phone [phone]   LACTATE, SEPSIS    Narrative:     Performed at:  [address] [email or web address] [place]    Phone [phone]   HEMOGLOBIN A1C    Narrative:     Collection has been rescheduled by [facility] at 12/28/2021 09:24 Reason: add   on  Performed at:  [address] [place] [place]    Phone [phone]   CBC WITH AUTO DIFFERENTIAL   BASIC METABOLIC PANEL   MAGNESIUM   POCT GLUCOSE   POCT GLUCOSE   POCT GLUCOSE   POCT GLUCOSE       PROCEDURES  Unless otherwise noted below, none  Procedures       CRITICAL CARE TIME  The total critical care time spent while evaluating and treating this patient was 0 minutes. This excludes time spent doing separately billable procedures.   This includes time at the bedside, data interpretation, medication management, obtaining critical history from collateral sources if the patient is unable to provide it directly, and physician consultation. Specifics of interventions taken and potentially life-threatening diagnostic considerations are listed above in the medical decision making. MDM  MDM  81-year-old male history of diabetes as well as recurrent lower extremity cellulitis presents emergency department for evaluation of sudden onset and rapidly progressing cellulitis in his right lower extremity. Onset was this afternoon and since 6 PM it has over doubled in size. On arrival to the ED patient was tachycardic at a rate of 118 bpm however afebrile. Lab work demonstrated leukocytosis at 15.8 confirming his sepsis. Lactic acid however was within normal limits. Patient started on IV fluids as well as broad-spectrum antibiotics. We will plan to request admission to the hospital service given patient's increased risk of outpatient failure. Given the recurrent cellulitis in that particular area increases his odds ratio of outpatient failure. Also patient is at increased risk of decompensation given his immunocompromise state with diabetes as well as his septic appearance. DISPOSITION  Admitted    CLINICAL IMPRESSION  1.  Sepsis due to cellulitis [facility])            Krao Dejesus PA-C  12/28/21 0066

## 2021-12-28 NOTE — PROGRESS NOTES
Medication Reconciliation    List of medications patient is currently taking is complete. Source of information: 1. RN Interview                                      2. EPIC records                                      3. Dispense history      Allergies  Patient has no known allergies.

## 2021-12-28 NOTE — ED NOTES
Ramiro line around redness on leg to have idea home much the redness is spreading.      Tito Gates, RN  12/28/21 4302

## 2021-12-29 VITALS
WEIGHT: 240.52 LBS | RESPIRATION RATE: 18 BRPM | DIASTOLIC BLOOD PRESSURE: 78 MMHG | SYSTOLIC BLOOD PRESSURE: 122 MMHG | HEIGHT: 72 IN | BODY MASS INDEX: 32.58 KG/M2 | TEMPERATURE: 98 F | OXYGEN SATURATION: 96 % | HEART RATE: 83 BPM

## 2021-12-29 LAB
ANION GAP SERPL CALCULATED.3IONS-SCNC: 12 MMOL/L (ref 3–16)
BASOPHILS ABSOLUTE: 0 K/UL (ref 0–0.2)
BASOPHILS RELATIVE PERCENT: 0.3 %
BUN BLDV-MCNC: 15 MG/DL (ref 7–20)
CALCIUM SERPL-MCNC: 8.3 MG/DL (ref 8.3–10.6)
CHLORIDE BLD-SCNC: 101 MMOL/L (ref 99–110)
CO2: 19 MMOL/L (ref 21–32)
CREAT SERPL-MCNC: <0.5 MG/DL (ref 0.9–1.3)
EOSINOPHILS ABSOLUTE: 0.1 K/UL (ref 0–0.6)
EOSINOPHILS RELATIVE PERCENT: 1.3 %
GFR AFRICAN AMERICAN: >60
GFR NON-AFRICAN AMERICAN: >60
GLUCOSE BLD-MCNC: 135 MG/DL (ref 70–99)
GLUCOSE BLD-MCNC: 87 MG/DL (ref 70–99)
GLUCOSE BLD-MCNC: 90 MG/DL (ref 70–99)
HCT VFR BLD CALC: 40.5 % (ref 40.5–52.5)
HEMOGLOBIN: 14.1 G/DL (ref 13.5–17.5)
LYMPHOCYTES ABSOLUTE: 1.1 K/UL (ref 1–5.1)
LYMPHOCYTES RELATIVE PERCENT: 15 %
MAGNESIUM: 2.3 MG/DL (ref 1.8–2.4)
MCH RBC QN AUTO: 31.4 PG (ref 26–34)
MCHC RBC AUTO-ENTMCNC: 34.9 G/DL (ref 31–36)
MCV RBC AUTO: 90 FL (ref 80–100)
MONOCYTES ABSOLUTE: 0.8 K/UL (ref 0–1.3)
MONOCYTES RELATIVE PERCENT: 11.7 %
NEUTROPHILS ABSOLUTE: 5.1 K/UL (ref 1.7–7.7)
NEUTROPHILS RELATIVE PERCENT: 71.7 %
PDW BLD-RTO: 13.1 % (ref 12.4–15.4)
PERFORMED ON: ABNORMAL
PERFORMED ON: NORMAL
PLATELET # BLD: 128 K/UL (ref 135–450)
PMV BLD AUTO: 7.7 FL (ref 5–10.5)
POTASSIUM SERPL-SCNC: 3.6 MMOL/L (ref 3.5–5.1)
RBC # BLD: 4.5 M/UL (ref 4.2–5.9)
SODIUM BLD-SCNC: 132 MMOL/L (ref 136–145)
WBC # BLD: 7.1 K/UL (ref 4–11)

## 2021-12-29 PROCEDURE — 80048 BASIC METABOLIC PNL TOTAL CA: CPT

## 2021-12-29 PROCEDURE — 94760 N-INVAS EAR/PLS OXIMETRY 1: CPT

## 2021-12-29 PROCEDURE — 6370000000 HC RX 637 (ALT 250 FOR IP): Performed by: FAMILY MEDICINE

## 2021-12-29 PROCEDURE — 36415 COLL VENOUS BLD VENIPUNCTURE: CPT

## 2021-12-29 PROCEDURE — 83735 ASSAY OF MAGNESIUM: CPT

## 2021-12-29 PROCEDURE — G0378 HOSPITAL OBSERVATION PER HR: HCPCS

## 2021-12-29 PROCEDURE — 96366 THER/PROPH/DIAG IV INF ADDON: CPT

## 2021-12-29 PROCEDURE — 6370000000 HC RX 637 (ALT 250 FOR IP): Performed by: STUDENT IN AN ORGANIZED HEALTH CARE EDUCATION/TRAINING PROGRAM

## 2021-12-29 PROCEDURE — 2580000003 HC RX 258: Performed by: STUDENT IN AN ORGANIZED HEALTH CARE EDUCATION/TRAINING PROGRAM

## 2021-12-29 PROCEDURE — 85025 COMPLETE CBC W/AUTO DIFF WBC: CPT

## 2021-12-29 PROCEDURE — 96372 THER/PROPH/DIAG INJ SC/IM: CPT

## 2021-12-29 PROCEDURE — 6360000002 HC RX W HCPCS: Performed by: FAMILY MEDICINE

## 2021-12-29 PROCEDURE — 6360000002 HC RX W HCPCS: Performed by: STUDENT IN AN ORGANIZED HEALTH CARE EDUCATION/TRAINING PROGRAM

## 2021-12-29 RX ORDER — CEPHALEXIN 500 MG/1
500 CAPSULE ORAL 4 TIMES DAILY
Qty: 28 CAPSULE | Refills: 0 | Status: SHIPPED | OUTPATIENT
Start: 2021-12-29 | End: 2022-01-05

## 2021-12-29 RX ORDER — SACCHAROMYCES BOULARDII 250 MG
250 CAPSULE ORAL 2 TIMES DAILY
Qty: 30 CAPSULE | Refills: 0 | Status: SHIPPED | OUTPATIENT
Start: 2021-12-29 | End: 2022-01-13

## 2021-12-29 RX ORDER — DOXYCYCLINE HYCLATE 100 MG
100 TABLET ORAL 2 TIMES DAILY
Qty: 14 TABLET | Refills: 0 | Status: SHIPPED | OUTPATIENT
Start: 2021-12-29 | End: 2022-01-05

## 2021-12-29 RX ADMIN — CEFAZOLIN 2000 MG: 10 INJECTION, POWDER, FOR SOLUTION INTRAVENOUS at 01:37

## 2021-12-29 RX ADMIN — Medication 1250 MG: at 09:36

## 2021-12-29 RX ADMIN — ASPIRIN 81 MG: 81 TABLET, COATED ORAL at 09:34

## 2021-12-29 RX ADMIN — SODIUM CHLORIDE, PRESERVATIVE FREE 10 ML: 5 INJECTION INTRAVENOUS at 09:44

## 2021-12-29 RX ADMIN — ENOXAPARIN SODIUM 40 MG: 100 INJECTION SUBCUTANEOUS at 09:34

## 2021-12-29 RX ADMIN — CEFAZOLIN 2000 MG: 10 INJECTION, POWDER, FOR SOLUTION INTRAVENOUS at 11:42

## 2021-12-29 RX ADMIN — LISINOPRIL 5 MG: 5 TABLET ORAL at 09:34

## 2021-12-29 ASSESSMENT — PAIN SCALES - GENERAL: PAINLEVEL_OUTOF10: 0

## 2021-12-29 NOTE — PLAN OF CARE
Problem: Pain:  Goal: Pain level will decrease  Description: Pain level will decrease  12/28/2021 2055 by Mckayla Segovia RN  Outcome: Ongoing  Note: Patient will continue to have no pain  12/28/2021 1126 by Dede Gonzalez RN  Outcome: Ongoing  Note: Educated patient on pain management. Will assess patients pain level per unit protocol, and provide pain management measures as needed.    Electronically signed by Dede Gonzalez RN on 12/28/2021 at 11:27 AM   Goal: Control of acute pain  Description: Control of acute pain  Outcome: Ongoing  Note: Patient will continue to have no pain  Goal: Control of chronic pain  Description: Control of chronic pain  Outcome: Ongoing  Note: Patient will continue to have no pain

## 2021-12-29 NOTE — DISCHARGE SUMMARY
Hospital Medicine Discharge Summary    Patient: Trevor Moody     Gender: male  : 1965   Age: 49 y.o. MRN: [id number]    Code Status: Full Code     Primary Care Provider: Dorcas Linares Date: 2021   Discharge Date:   2021    Admitting Physician: Nancy Kumar DO  Discharge Physician: Silvino Krueger DO     Discharge Diagnoses: Active Hospital Problems    Diagnosis Date Noted    Cellulitis [L03.90] 2021    Sepsis due to cellulitis (HCC) [L03.90, A41.9] 2021    DM2 (diabetes mellitus, type 2) ([facility] [address] [E11.9] 2021       Hospital Course: A 48 yo male was admitted with cellulitis. Work up completed, and improved with treatment as below. was discharged today in stable condition. Cellulitis - improving   - ct/us without abscess  - leukocytosis resolved  - blood cx ngtd  - vanc, ancef while in pt, discharged today with doxy and keflex      Diabetes  - stable, hba1c is 6.7  - cont home PO meds    Disposition:  Home    Exam:     /78   Pulse 83   Temp 98 °F (36.7 °C) (Oral)   Resp 18   Ht 6' (1.829 m)   Wt 240 lb 8.4 oz (109.1 kg)   SpO2 96%   BMI 32.62 kg/m²     General appearance:  No apparent distress, appears stated age and cooperative. HEENT:  Normal cephalic, atraumatic without obvious deformity. Pupils equal, round, and reactive to light. Extra ocular muscles intact. Conjunctivae/corneas clear. Neck: Supple, with full range of motion. No jugular venous distention. Trachea midline. Respiratory:  Normal respiratory effort. Clear to auscultation, bilaterally without Rales/Wheezes/Rhonchi. Cardiovascular:  Regular rate and rhythm with normal S1/S2 without murmurs, rubs or gallops. Abdomen: Soft, non-tender, non-distended with normal bowel sounds. Musculoskeletal:  No clubbing, cyanosis or edema bilaterally. Full range of motion without deformity.   Skin: Skin color, texture, turgor normal.  Improving erythema, no edema or tenderness  Neurologic:  Neurovascularly intact without any focal sensory/motor deficits. Cranial nerves: II-XII intact, grossly non-focal.  Psychiatric:  Alert and oriented, thought content appropriate, normal insight    Consults:     IP CONSULT TO HOSPITALIST  PHARMACY TO DOSE VANCOMYCIN    Labs: For convenience and continuity at follow-up the following most recent labs are provided:    Lab Results   Component Value Date    WBC 7.1 12/29/2021    HGB 14.1 12/29/2021    HCT 40.5 12/29/2021    MCV 90.0 12/29/2021     12/29/2021     12/29/2021    K 3.6 12/29/2021    K 3.9 12/28/2021     12/29/2021    CO2 19 12/29/2021    BUN 15 12/29/2021    CREATININE <0.5 12/29/2021    CALCIUM 8.3 12/29/2021     No results found for: INR    Radiology:  US SOFT TISSUE LIMITED AREA   Final Result   Soft tissue edema in the volar aspect of the calf. No focal abscess   identified.              Discharge Medications:   Current Discharge Medication List      START taking these medications    Details   cephALEXin (KEFLEX) 500 MG capsule Take 1 capsule by mouth 4 times daily for 7 days  Qty: 28 capsule, Refills: 0      doxycycline hyclate (VIBRA-TABS) 100 MG tablet Take 1 tablet by mouth 2 times daily for 7 days  Qty: 14 tablet, Refills: 0      saccharomyces boulardii (FLORASTOR) 250 MG capsule Take 1 capsule by mouth 2 times daily for 15 days  Qty: 30 capsule, Refills: 0           Current Discharge Medication List        Current Discharge Medication List      CONTINUE these medications which have NOT CHANGED    Details   empagliflozin (JARDIANCE) 25 MG tablet Take 25 mg by mouth daily      aspirin EC 81 MG EC tablet Take 81 mg by mouth daily      metFORMIN (GLUCOPHAGE) 1000 MG tablet Take by mouth See Admin Instructions 500 mg with Breakfast and 1000 mg with Dinner  Refills: 3      lisinopril (PRINIVIL;ZESTRIL) 5 MG tablet Take 5 mg by mouth daily   Refills: 3      atorvastatin (LIPITOR) 10 MG tablet Take 10 mg by mouth daily   Refills: 3           Current Discharge Medication List          Follow-up appointments:  one week    Provider Follow-up:    pcp    Condition at Discharge:  Stable    The patient was seen and examined on day of discharge and this discharge summary is in conjunction with any daily progress note from day of discharge. Time Spent on discharge is 45 minutes  in the examination, evaluation, counseling and review of medications and discharge plan. Signed:    Francis Garza DO   12/29/2021      Thank you Alberto Nuñez for the opportunity to be involved in this patient's care. If you have any questions or concerns please feel free to contact me at [phone].

## 2021-12-29 NOTE — PROGRESS NOTES
Patient alert and oriented, discharged to home with documented belongings. IV removed with no complications. Transported out of hospitals by wheelchair. Reviewed discharge, follow up, and medication instructions with patient and family member, patient and family member verbalized understanding.  Electronically signed by Batsheva Harp RN on 12/29/2021 at 5:58 PM

## 2021-12-29 NOTE — PLAN OF CARE
Problem: Pain:  Goal: Pain level will decrease  Description: Pain level will decrease  12/29/2021 0722 by Suad De Leon RN  Outcome: Ongoing  Note: Pt assessed for pain. Pt in pain and assessed with 0-10 pain rating scale. Pt given prescribed analgesic for pain. (See eMar) Pt satisfied with pain relief thus far. Will reassess and continue to monitor. Problem: Pain:  Goal: Control of acute pain  Description: Control of acute pain  12/29/2021 0722 by Suad De Leon RN  Outcome: Ongoing  Note: Pt assessed for pain. Pt in pain and assessed with 0-10 pain rating scale. Pt given prescribed analgesic for pain. (See eMar) Pt satisfied with pain relief thus far. Will reassess and continue to monitor. Problem: Pain:  Goal: Control of chronic pain  Description: Control of chronic pain  12/29/2021 0722 by Suad De Leon RN  Outcome: Ongoing  Note: Pt assessed for pain. Pt in pain and assessed with 0-10 pain rating scale. Pt given prescribed analgesic for pain. (See eMar) Pt satisfied with pain relief thus far. Will reassess and continue to monitor.

## 2021-12-29 NOTE — PROGRESS NOTES
Patient resting in bed this shift, no acute complaints at this time, intermittent IV abx infusions running. RLE red and warm, outlined. All needs met at this time, will continue to monitor.

## 2021-12-29 NOTE — PROGRESS NOTES
Patient is alert & oriented x4, UAL, 2/4 bed rails up, bed in lowest position, fall precautions in place, call light within reach. Morning medications given without complications. Intermittent antibiotics infusing. Redness on R leg remains within marker outline. Will cont to monitor and reassess.  Electronically signed by Massiel Anthony RN on 12/29/2021 at 9:52 AM

## 2022-01-01 LAB
BLOOD CULTURE, ROUTINE: NORMAL
CULTURE, BLOOD 2: NORMAL

## 2022-02-27 ENCOUNTER — HOSPITAL ENCOUNTER (INPATIENT)
Age: 57
LOS: 2 days | Discharge: HOME | DRG: 872 | End: 2022-03-02
Attending: STUDENT IN AN ORGANIZED HEALTH CARE EDUCATION/TRAINING PROGRAM | Admitting: STUDENT IN AN ORGANIZED HEALTH CARE EDUCATION/TRAINING PROGRAM
Payer: COMMERCIAL

## 2022-02-27 ENCOUNTER — APPOINTMENT (OUTPATIENT)
Dept: GENERAL RADIOLOGY | Age: 57
DRG: 872 | End: 2022-02-27
Payer: COMMERCIAL

## 2022-02-27 DIAGNOSIS — A41.9 SEPTICEMIA (HCC): ICD-10-CM

## 2022-02-27 DIAGNOSIS — L08.9 DIABETIC FOOT INFECTION (HCC): Primary | ICD-10-CM

## 2022-02-27 DIAGNOSIS — E11.628 DIABETIC FOOT INFECTION (HCC): Primary | ICD-10-CM

## 2022-02-27 LAB
A/G RATIO: 1.4 (ref 1.1–2.2)
ALBUMIN SERPL-MCNC: 4.1 G/DL (ref 3.4–5)
ALP BLD-CCNC: 92 U/L (ref 40–129)
ALT SERPL-CCNC: 22 U/L (ref 10–40)
ANION GAP SERPL CALCULATED.3IONS-SCNC: 14 MMOL/L (ref 3–16)
AST SERPL-CCNC: 19 U/L (ref 15–37)
BASOPHILS ABSOLUTE: 0.1 K/UL (ref 0–0.2)
BASOPHILS RELATIVE PERCENT: 0.4 %
BILIRUB SERPL-MCNC: 1.3 MG/DL (ref 0–1)
BUN BLDV-MCNC: 15 MG/DL (ref 7–20)
CALCIUM SERPL-MCNC: 8.9 MG/DL (ref 8.3–10.6)
CHLORIDE BLD-SCNC: 96 MMOL/L (ref 99–110)
CO2: 19 MMOL/L (ref 21–32)
CREAT SERPL-MCNC: 0.5 MG/DL (ref 0.9–1.3)
EOSINOPHILS ABSOLUTE: 0.1 K/UL (ref 0–0.6)
EOSINOPHILS RELATIVE PERCENT: 0.6 %
GFR AFRICAN AMERICAN: >60
GFR NON-AFRICAN AMERICAN: >60
GLUCOSE BLD-MCNC: 244 MG/DL (ref 70–99)
HCT VFR BLD CALC: 44 % (ref 40.5–52.5)
HEMOGLOBIN: 15.3 G/DL (ref 13.5–17.5)
LACTIC ACID: 1.4 MMOL/L (ref 0.4–2)
LYMPHOCYTES ABSOLUTE: 0.6 K/UL (ref 1–5.1)
LYMPHOCYTES RELATIVE PERCENT: 4.4 %
MCH RBC QN AUTO: 30.6 PG (ref 26–34)
MCHC RBC AUTO-ENTMCNC: 34.7 G/DL (ref 31–36)
MCV RBC AUTO: 88.3 FL (ref 80–100)
MONOCYTES ABSOLUTE: 0.9 K/UL (ref 0–1.3)
MONOCYTES RELATIVE PERCENT: 6.7 %
NEUTROPHILS ABSOLUTE: 12.3 K/UL (ref 1.7–7.7)
NEUTROPHILS RELATIVE PERCENT: 87.9 %
PDW BLD-RTO: 13.4 % (ref 12.4–15.4)
PLATELET # BLD: 179 K/UL (ref 135–450)
PMV BLD AUTO: 7.4 FL (ref 5–10.5)
POTASSIUM REFLEX MAGNESIUM: 3.9 MMOL/L (ref 3.5–5.1)
RBC # BLD: 4.99 M/UL (ref 4.2–5.9)
SODIUM BLD-SCNC: 129 MMOL/L (ref 136–145)
TOTAL PROTEIN: 7 G/DL (ref 6.4–8.2)
WBC # BLD: 14 K/UL (ref 4–11)

## 2022-02-27 PROCEDURE — 96365 THER/PROPH/DIAG IV INF INIT: CPT

## 2022-02-27 PROCEDURE — 83605 ASSAY OF LACTIC ACID: CPT

## 2022-02-27 PROCEDURE — 93005 ELECTROCARDIOGRAM TRACING: CPT | Performed by: PHYSICIAN ASSISTANT

## 2022-02-27 PROCEDURE — 90471 IMMUNIZATION ADMIN: CPT | Performed by: PHYSICIAN ASSISTANT

## 2022-02-27 PROCEDURE — 2580000003 HC RX 258: Performed by: PHYSICIAN ASSISTANT

## 2022-02-27 PROCEDURE — 87040 BLOOD CULTURE FOR BACTERIA: CPT

## 2022-02-27 PROCEDURE — 36415 COLL VENOUS BLD VENIPUNCTURE: CPT

## 2022-02-27 PROCEDURE — 90715 TDAP VACCINE 7 YRS/> IM: CPT | Performed by: PHYSICIAN ASSISTANT

## 2022-02-27 PROCEDURE — 80053 COMPREHEN METABOLIC PANEL: CPT

## 2022-02-27 PROCEDURE — 85025 COMPLETE CBC W/AUTO DIFF WBC: CPT

## 2022-02-27 PROCEDURE — 73630 X-RAY EXAM OF FOOT: CPT

## 2022-02-27 PROCEDURE — 99284 EMERGENCY DEPT VISIT MOD MDM: CPT

## 2022-02-27 PROCEDURE — 87150 DNA/RNA AMPLIFIED PROBE: CPT

## 2022-02-27 PROCEDURE — 6370000000 HC RX 637 (ALT 250 FOR IP): Performed by: PHYSICIAN ASSISTANT

## 2022-02-27 PROCEDURE — 6360000002 HC RX W HCPCS: Performed by: PHYSICIAN ASSISTANT

## 2022-02-27 RX ORDER — 0.9 % SODIUM CHLORIDE 0.9 %
30 INTRAVENOUS SOLUTION INTRAVENOUS ONCE
Status: COMPLETED | OUTPATIENT
Start: 2022-02-27 | End: 2022-02-27

## 2022-02-27 RX ORDER — ACETAMINOPHEN 325 MG/1
650 TABLET ORAL ONCE
Status: COMPLETED | OUTPATIENT
Start: 2022-02-27 | End: 2022-02-27

## 2022-02-27 RX ADMIN — ACETAMINOPHEN 650 MG: 325 TABLET ORAL at 21:51

## 2022-02-27 RX ADMIN — MEROPENEM 1000 MG: 1 INJECTION, POWDER, FOR SOLUTION INTRAVENOUS at 21:56

## 2022-02-27 RX ADMIN — SODIUM CHLORIDE 3228 ML: 9 INJECTION, SOLUTION INTRAVENOUS at 21:52

## 2022-02-27 RX ADMIN — TETANUS TOXOID, REDUCED DIPHTHERIA TOXOID AND ACELLULAR PERTUSSIS VACCINE, ADSORBED 0.5 ML: 5; 2.5; 8; 8; 2.5 SUSPENSION INTRAMUSCULAR at 22:06

## 2022-02-27 ASSESSMENT — PAIN SCALES - GENERAL: PAINLEVEL_OUTOF10: 0

## 2022-02-28 PROBLEM — A41.9 SEPSIS (HCC): Status: ACTIVE | Noted: 2022-02-28

## 2022-02-28 PROBLEM — E11.65 UNCONTROLLED TYPE 2 DIABETES MELLITUS WITH HYPERGLYCEMIA (HCC): Status: ACTIVE | Noted: 2022-02-28

## 2022-02-28 PROBLEM — L03.116 CELLULITIS OF FOOT, LEFT: Status: ACTIVE | Noted: 2022-02-28

## 2022-02-28 LAB
EKG ATRIAL RATE: 108 BPM
EKG DIAGNOSIS: NORMAL
EKG P AXIS: 67 DEGREES
EKG P-R INTERVAL: 182 MS
EKG Q-T INTERVAL: 336 MS
EKG QRS DURATION: 100 MS
EKG QTC CALCULATION (BAZETT): 450 MS
EKG R AXIS: -16 DEGREES
EKG T AXIS: 62 DEGREES
EKG VENTRICULAR RATE: 108 BPM
ESTIMATED AVERAGE GLUCOSE: 174.3 MG/DL
GLUCOSE BLD-MCNC: 119 MG/DL (ref 70–99)
GLUCOSE BLD-MCNC: 137 MG/DL (ref 70–99)
GLUCOSE BLD-MCNC: 150 MG/DL (ref 70–99)
HBA1C MFR BLD: 7.7 %
LACTIC ACID, SEPSIS: 1 MMOL/L (ref 0.4–1.9)
LACTIC ACID, SEPSIS: 1.2 MMOL/L (ref 0.4–1.9)
LACTIC ACID: 0.8 MMOL/L (ref 0.4–2)
LACTIC ACID: 1.2 MMOL/L (ref 0.4–2)
PERFORMED ON: ABNORMAL
REPORT: NORMAL

## 2022-02-28 PROCEDURE — 6370000000 HC RX 637 (ALT 250 FOR IP): Performed by: NURSE PRACTITIONER

## 2022-02-28 PROCEDURE — 2580000003 HC RX 258: Performed by: NURSE PRACTITIONER

## 2022-02-28 PROCEDURE — 87040 BLOOD CULTURE FOR BACTERIA: CPT

## 2022-02-28 PROCEDURE — 1200000000 HC SEMI PRIVATE

## 2022-02-28 PROCEDURE — 93010 ELECTROCARDIOGRAM REPORT: CPT | Performed by: INTERNAL MEDICINE

## 2022-02-28 PROCEDURE — 6360000002 HC RX W HCPCS: Performed by: FAMILY MEDICINE

## 2022-02-28 PROCEDURE — 2580000003 HC RX 258: Performed by: FAMILY MEDICINE

## 2022-02-28 PROCEDURE — 36415 COLL VENOUS BLD VENIPUNCTURE: CPT

## 2022-02-28 PROCEDURE — 83036 HEMOGLOBIN GLYCOSYLATED A1C: CPT

## 2022-02-28 PROCEDURE — 6360000002 HC RX W HCPCS: Performed by: NURSE PRACTITIONER

## 2022-02-28 PROCEDURE — 6370000000 HC RX 637 (ALT 250 FOR IP): Performed by: PODIATRIST

## 2022-02-28 PROCEDURE — 6360000002 HC RX W HCPCS: Performed by: PHYSICIAN ASSISTANT

## 2022-02-28 PROCEDURE — 83605 ASSAY OF LACTIC ACID: CPT

## 2022-02-28 PROCEDURE — 2580000003 HC RX 258: Performed by: PHYSICIAN ASSISTANT

## 2022-02-28 RX ORDER — ASPIRIN 81 MG/1
81 TABLET ORAL DAILY
Status: DISCONTINUED | OUTPATIENT
Start: 2022-02-28 | End: 2022-03-02 | Stop reason: HOSPADM

## 2022-02-28 RX ORDER — ATORVASTATIN CALCIUM 10 MG/1
10 TABLET, FILM COATED ORAL DAILY
Status: DISCONTINUED | OUTPATIENT
Start: 2022-02-28 | End: 2022-03-02 | Stop reason: HOSPADM

## 2022-02-28 RX ORDER — NICOTINE POLACRILEX 4 MG
15 LOZENGE BUCCAL PRN
Status: DISCONTINUED | OUTPATIENT
Start: 2022-02-28 | End: 2022-03-02 | Stop reason: HOSPADM

## 2022-02-28 RX ORDER — DEXTROSE MONOHYDRATE 50 MG/ML
100 INJECTION, SOLUTION INTRAVENOUS PRN
Status: DISCONTINUED | OUTPATIENT
Start: 2022-02-28 | End: 2022-03-02 | Stop reason: HOSPADM

## 2022-02-28 RX ORDER — LISINOPRIL 5 MG/1
5 TABLET ORAL DAILY
Status: DISCONTINUED | OUTPATIENT
Start: 2022-02-28 | End: 2022-03-02 | Stop reason: HOSPADM

## 2022-02-28 RX ORDER — SODIUM CHLORIDE 0.9 % (FLUSH) 0.9 %
5-40 SYRINGE (ML) INJECTION PRN
Status: DISCONTINUED | OUTPATIENT
Start: 2022-02-28 | End: 2022-03-02 | Stop reason: HOSPADM

## 2022-02-28 RX ORDER — SODIUM CHLORIDE 9 MG/ML
25 INJECTION, SOLUTION INTRAVENOUS PRN
Status: DISCONTINUED | OUTPATIENT
Start: 2022-02-28 | End: 2022-03-02 | Stop reason: HOSPADM

## 2022-02-28 RX ORDER — ACETAMINOPHEN 325 MG/1
650 TABLET ORAL EVERY 6 HOURS PRN
Status: DISCONTINUED | OUTPATIENT
Start: 2022-02-28 | End: 2022-03-02 | Stop reason: HOSPADM

## 2022-02-28 RX ORDER — ACETAMINOPHEN 650 MG/1
650 SUPPOSITORY RECTAL EVERY 6 HOURS PRN
Status: DISCONTINUED | OUTPATIENT
Start: 2022-02-28 | End: 2022-03-02 | Stop reason: HOSPADM

## 2022-02-28 RX ORDER — SODIUM CHLORIDE 0.9 % (FLUSH) 0.9 %
5-40 SYRINGE (ML) INJECTION EVERY 12 HOURS SCHEDULED
Status: DISCONTINUED | OUTPATIENT
Start: 2022-02-28 | End: 2022-03-02 | Stop reason: HOSPADM

## 2022-02-28 RX ORDER — DEXTROSE MONOHYDRATE 25 G/50ML
12.5 INJECTION, SOLUTION INTRAVENOUS PRN
Status: DISCONTINUED | OUTPATIENT
Start: 2022-02-28 | End: 2022-03-02 | Stop reason: HOSPADM

## 2022-02-28 RX ADMIN — ASPIRIN 81 MG: 81 TABLET, COATED ORAL at 19:59

## 2022-02-28 RX ADMIN — VANCOMYCIN HYDROCHLORIDE 1500 MG: 1 INJECTION, POWDER, LYOPHILIZED, FOR SOLUTION INTRAVENOUS at 00:13

## 2022-02-28 RX ADMIN — LISINOPRIL 5 MG: 5 TABLET ORAL at 19:59

## 2022-02-28 RX ADMIN — SODIUM CHLORIDE 25 ML: 9 INJECTION, SOLUTION INTRAVENOUS at 09:34

## 2022-02-28 RX ADMIN — AMPICILLIN AND SULBACTAM 3000 MG: 1; 2 INJECTION, POWDER, FOR SOLUTION INTRAMUSCULAR; INTRAVENOUS at 20:05

## 2022-02-28 RX ADMIN — ENOXAPARIN SODIUM 40 MG: 100 INJECTION SUBCUTANEOUS at 09:32

## 2022-02-28 RX ADMIN — CEFEPIME HYDROCHLORIDE 2000 MG: 2 INJECTION, POWDER, FOR SOLUTION INTRAVENOUS at 09:35

## 2022-02-28 RX ADMIN — ATORVASTATIN CALCIUM 10 MG: 10 TABLET, FILM COATED ORAL at 19:59

## 2022-02-28 RX ADMIN — INSULIN LISPRO 2 UNITS: 100 INJECTION, SOLUTION INTRAVENOUS; SUBCUTANEOUS at 09:41

## 2022-02-28 RX ADMIN — SODIUM CHLORIDE 25 ML: 9 INJECTION, SOLUTION INTRAVENOUS at 12:46

## 2022-02-28 RX ADMIN — Medication 1250 MG: at 12:47

## 2022-02-28 RX ADMIN — SODIUM CHLORIDE, PRESERVATIVE FREE 10 ML: 5 INJECTION INTRAVENOUS at 09:32

## 2022-02-28 RX ADMIN — Medication: at 15:13

## 2022-02-28 ASSESSMENT — PAIN DESCRIPTION - LOCATION: LOCATION: FOOT

## 2022-02-28 ASSESSMENT — ENCOUNTER SYMPTOMS
VOMITING: 0
SHORTNESS OF BREATH: 0
COLOR CHANGE: 1

## 2022-02-28 ASSESSMENT — PAIN - FUNCTIONAL ASSESSMENT: PAIN_FUNCTIONAL_ASSESSMENT: ACTIVITIES ARE NOT PREVENTED

## 2022-02-28 ASSESSMENT — PAIN DESCRIPTION - ORIENTATION: ORIENTATION: LEFT

## 2022-02-28 ASSESSMENT — PAIN DESCRIPTION - DESCRIPTORS: DESCRIPTORS: NUMBNESS

## 2022-02-28 ASSESSMENT — PAIN DESCRIPTION - PAIN TYPE: TYPE: ACUTE PAIN

## 2022-02-28 ASSESSMENT — PAIN DESCRIPTION - FREQUENCY: FREQUENCY: CONTINUOUS

## 2022-02-28 ASSESSMENT — PAIN DESCRIPTION - ONSET: ONSET: ON-GOING

## 2022-02-28 ASSESSMENT — PAIN DESCRIPTION - PROGRESSION: CLINICAL_PROGRESSION: GRADUALLY WORSENING

## 2022-02-28 ASSESSMENT — PAIN SCALES - GENERAL: PAINLEVEL_OUTOF10: 2

## 2022-02-28 NOTE — PROGRESS NOTES
Comprehensive Nutrition Assessment    Type and Reason for Visit:  Initial,Positive Nutrition Screen    Nutrition Recommendations/Plan:   Encouraged pt too contact Dietitian should any questions arise regarding diet    Nutrition Assessment:  Pt triggered a positive screen for altered skin integrity. PMH includes DM. Pt adm r/t left red/swollen foot. Found to have cellulitis and sepsis. Diet adv to [place] [address] (3). Pt reported and records confirm a good po intake. Pt stated that appetite has been down for the past few days, but declined need for supplementation at this time. Pt also declined ed needs. Will continue to follow. Malnutrition Assessment:  Malnutrition Status:  No malnutrition    Context:  Acute Illness       Nutrition Related Findings:  Noted A1C on 12/28/21 at 6.7. Noted +1 edema to BLE. Wounds:   (Noted cellulitis to left foot)       Current Nutrition Therapies:    ADULT DIET; Regular; 3 carb choices (45 gm/meal)    Anthropometric Measures:  · Height: 6' (182.9 cm)  · Current Body Weight: 237 lb (107.5 kg)   · Admission Body Weight: 237 lb (107.5 kg)    · Ideal Body Weight: 178 lbs; % Ideal Body Weight 133.1 %   · BMI: 32.1  · BMI Categories: Obese Class 1 (BMI 30.0-34. 9)       Nutrition Diagnosis:   · No nutrition diagnosis at this time related to   as evidenced by        Nutrition Interventions:   Food and/or Nutrient Delivery:  Continue Current Diet  Nutrition Education/Counseling:  Education declined   Coordination of Nutrition Care:  Continue to monitor while inpatient    Goals:  consume >/= to 50%       Nutrition Monitoring and Evaluation:   Behavioral-Environmental Outcomes:  None Identified   Food/Nutrient Intake Outcomes:  Food and Nutrient Intake  Physical Signs/Symptoms Outcomes:  Biochemical Data,Constipation,Diarrhea,Fluid Status or Edema,Skin,Weight     Discharge Planning:    No discharge needs at this time     Electronically signed by Alisha Coats RD, LD on 2/28/22 at 1:53 PM EST    Contact: [phone]

## 2022-02-28 NOTE — PLAN OF CARE
Problem: Falls - Risk of:  Goal: Will remain free from falls  Description: Will remain free from falls  Outcome: Ongoing  Note: Fall risk assessment completed. Fall precautions in place. Call light within reach. Pt educated on calling for assistance before getting up. Walkway free of clutter. Will continue to monitor. Goal: Absence of physical injury  Description: Absence of physical injury  Outcome: Ongoing  Note: Pt is free of injury. No injury noted. Fall precautions in place. Call light within reach. Will monitor. Problem: Pain:  Goal: Pain level will decrease  Description: Pain level will decrease  Outcome: Ongoing  Goal: Control of acute pain  Description: Control of acute pain  Outcome: Ongoing  Note: Pt assessed for pain. Pt in pain and assessed with 0-10 pain rating scale. Pt given prescribed analgesic for pain. (See eMar) Pt satisfied with pain relief thus far. Will reassess and continue to monitor.      Goal: Control of chronic pain  Description: Control of chronic pain  Outcome: Ongoing   Electronically signed by Savanna Roy RN on 2/28/2022 at 6:45 AM

## 2022-02-28 NOTE — PLAN OF CARE
Problem: Falls - Risk of:  Goal: Will remain free from falls  Description: Will remain free from falls  2/28/2022 0737 by Lacy Mckee RN  Outcome: Ongoing  Note: Fall risk assessment completed. Fall precautions in place. Bed in lowest position, wheels locked, bed/chair exit alarm in place, call light within reach, and non skid footwear on. Walkway free of clutter. Pt alert and oriented and able to make needs known. Pt educated to use call light when needing to get up, and pt utilizes call light to make needs known. Will continue to monitor. Problem: Falls - Risk of:  Goal: Absence of physical injury  Description: Absence of physical injury  2/28/2022 0737 by Lacy Mckee RN  Outcome: Ongoing  Note: Pt absent from physical injury      Problem: Pain:  Goal: Pain level will decrease  Description: Pain level will decrease  2/28/2022 0737 by Lacy Mckee RN  Outcome: Ongoing  Note: Assessing and monitoring pt's pain. PRN pain medication being given if ordered. Educating pt on nonpharmacologic interventions for pain control. Will continue to monitor and reassess. Problem: Pain:  Goal: Control of acute pain  Description: Control of acute pain  2/28/2022 0737 by Lacy Mckee RN  Outcome: Ongoing  Note: Assessing and monitoring pt's pain. PRN pain medication being given if ordered. Educating pt on nonpharmacologic interventions for pain control. Will continue to monitor and reassess. Problem: Pain:  Goal: Control of chronic pain  Description: Control of chronic pain  2/28/2022 0737 by Lacy Mckee RN  Outcome: Ongoing  Note: Assessing and monitoring pt's pain. PRN pain medication being given if ordered. Educating pt on nonpharmacologic interventions for pain control. Will continue to monitor and reassess.

## 2022-02-28 NOTE — PROGRESS NOTES
Medication Reconciliation     List of medications patient is currently taking is complete. Source of information:   1. Conversation with patient at bedside  2. EPIC records        Notes regarding home medications:  1. Patient received all of his morning home medications today.   Denies other otc/herbal use  Brandyn Hernandez, Pharmacy Intern 2/27/2022 10:13 PM

## 2022-02-28 NOTE — PROGRESS NOTES
Pt arrived to floor from ER at [id number] stretcher. Pt oriented to room, call light, policies and procedures, the menu and ordering. Call light within reach. Bed in lowest position, bed alarm on, and wheels locked. Pt verbalized understanding. No complaints, questions or concerns at this time.   Electronically signed by Aneesh Slade RN on 2/28/2022 at 6:46 AM

## 2022-02-28 NOTE — CONSULTS
Department of Podiatry Consult Note  Jacky Naidu. Andi Gold DPM Attending       Reason for Consult:  LEFT sub 1st ulceration with cellulitis  Requesting Physician:  Franklin Leo    CHIEF COMPLAINT:  Ulceration of LEFT sub 1st MTPJ with extendning cellulitis to LEFT calf    HISTORY OF PRESENT ILLNESS:                The patient is a 56 y.o. male with significant past medical history of Diabetes with peripheral neuropathy who is consulted for open wound of LEFT sub 1st met-phalangeal joint of approximately 1 month's duration. Patient relates he began a work-out routine and noted blister to LEFT plantar foot that worsened to point of drainage, chills and malaise over last 3 days    Past Medical History:        Diagnosis Date    Cellulitis     Diabetes mellitus ([address]      Past Surgical History:    History reviewed. No pertinent surgical history.   Current Medications:    Current Facility-Administered Medications: aspirin EC tablet 81 mg, 81 mg, Oral, Daily  atorvastatin (LIPITOR) tablet 10 mg, 10 mg, Oral, Daily  lisinopril (PRINIVIL;ZESTRIL) tablet 5 mg, 5 mg, Oral, Daily  sodium chloride flush 0.9 % injection 5-40 mL, 5-40 mL, IntraVENous, 2 times per day  sodium chloride flush 0.9 % injection 5-40 mL, 5-40 mL, IntraVENous, PRN  0.9 % sodium chloride infusion, 25 mL, IntraVENous, PRN  enoxaparin (LOVENOX) injection 40 mg, 40 mg, SubCUTAneous, Daily  acetaminophen (TYLENOL) tablet 650 mg, 650 mg, Oral, Q6H PRN **OR** acetaminophen (TYLENOL) suppository 650 mg, 650 mg, Rectal, Q6H PRN  glucose (GLUTOSE) 40 % oral gel 15 g, 15 g, Oral, PRN  dextrose 50 % IV solution, 12.5 g, IntraVENous, PRN  glucagon (rDNA) injection 1 mg, 1 mg, IntraMUSCular, PRN  dextrose 5 % solution, 100 mL/hr, IntraVENous, PRN  insulin lispro (HUMALOG) injection vial 0-12 Units, 0-12 Units, SubCUTAneous, TID WC  insulin lispro (HUMALOG) injection vial 0-6 Units, 0-6 Units, SubCUTAneous, Nightly  medihoney wound/burn dressing gel, , Topical, Daily  ampicillin-sulbactam (UNASYN) 3000 mg ivpb minibag, 3,000 mg, IntraVENous, Q6H  Allergies:   Testosterone  Social History:    TOBACCO:  Never used tobacco  ETOH:  Never drank alcohol  INSTRUMENTAL ACTIVITIES OF DAILY LIVING:  Patient is able to perform all instrumental activities of daily living. MARITAL STATUS:    Patient currently lives with family wife Bernie Ochoa  Family History:   History reviewed. No pertinent family history. REVIEW OF SYSTEMS:    CONSTITUTIONAL:  Resolving chills  INTEGUMENT/BREAST:  positive for skin color change, changes in lesion and changes in hair  ENDOCRINE:  positive for diabetic symptoms including neither foot ulcerations nor skin dryness nor neuropathy  MUSCULOSKELETAL:  positive for  joint swelling and decreased range of motion  NEUROLOGICAL:  positive for gait problems and numbness  PHYSICAL EXAM:      Vitals:    BP (!) 149/65   Pulse 96   Temp 99 °F (37.2 °C) (Oral)   Resp 16   Ht 6' (1.829 m)   Wt 237 lb 3.4 oz (107.6 kg)   SpO2 97%   BMI 32.17 kg/m²     LABS:   Recent Labs     02/27/22 2123   WBC 14.0*   HGB 15.3   HCT 44.0        Recent Labs     02/27/22 2123   *   K 3.9   CL 96*   CO2 19*   BUN 15   CREATININE 0.5*     Recent Labs     02/27/22 2123   PROT 7.0       LOWER EXTREMITY EXAMINATION   SKIN:    LEFT plantar foot with FULL thickness ulceration of 2.3 x 2.6 cm with depth of sub Q exposure with streaming pink erythema of dorsal foot to mid-calf. No active drainage, no purulence        NEUROLOGIC:    Pain sensation isnot significantly changed Bilateral.  Light touch is decreasedLeft. positive history of paresthesia Bilateral. negativehistory of burning Bilateral. Deep tendon reflexes are 2 to include patellar and achilles Bilateral. [place]--Melyssa 5.07 monofilament sensitivity is abnormal 2 to 5 spots tested Bilateral.    VASCULAR:    bilaterally Dorsalis pedis is 2. bilaterally Posterior tibial pulse is 2. 1+ edema left.  none

## 2022-02-28 NOTE — PROGRESS NOTES
Dr. Delaney Segovia made aware of positive blood culture.    Electronically signed by Sumaya Mai RN on 2/28/2022 at 1:01 PM

## 2022-02-28 NOTE — PROGRESS NOTES
4 Eyes Skin Assessment     NAME:  Alexander Gomez  YOB: 1965  MEDICAL RECORD NUMBER:  [id number]    The patient is being assess for  Admission    I agree that 2 RN's have performed a thorough Head to Toe Skin Assessment on the patient. ALL assessment sites listed below have been assessed. Areas assessed by both nurses:    Head, Face, Ears, Shoulders, Back, Chest, Arms, Elbows, Hands, Sacrum. Buttock, Coccyx, Ischium and Legs. Feet and Heels        Does the Patient have a Wound?  Other wound on left foot       Yony Prevention initiated:  Yes   Wound Care Orders initiated:  Yes per podiatry    Pressure Injury (Stage 3,4, Unstageable, DTI, NWPT, and Complex wounds) if present place consult order under [de-identified] No    New and Established Ostomies if present place consult order under : No      Nurse 1 eSignature: Electronically signed by Tate Lambert RN on 2/28/22 at 1:05 PM EST    **SHARE this note so that the co-signing nurse is able to place an eSignature**    Nurse 2 eSignature: Electronically signed by Ramirez Fournier RN on 2/28/22 at 6:21 PM EST

## 2022-02-28 NOTE — ED PROVIDER NOTES
[address] [facility]      Pt Name: Renee Mabry  MRN: [id number]  [place] 1965  Date of evaluation: 2/27/2022  Provider: LILIAN Galaviz    This patient was not seen and evaluated by the attending physician. CHIEF COMPLAINT       Chief Complaint   Patient presents with    Other     Pt states that he thinks he has cellulitis in his left foot because he has chills. Had his Covid booster on Friday. Denies foot pain, redness or swelling       CRITICAL CARE TIME   I performed a total Critical Care time of  35 minutes, excluding separately reportable procedures. There was a high probability of clinically significant/life threatening deterioration in the patient's condition which required my urgent intervention. Not limited to multiple reexaminations, discussions with attending physician and consultants. HISTORY OF PRESENT ILLNESS  (Location/Symptom, Timing/Onset, Context/Setting, Quality, Duration, Modifying Factors, Severity.)   Renee Mabry is a 56 y.o. male who presents to the emergency department with a foot infection on the left and a fever. He tells me that he has been dealing with the wound that initially started as a blister at the base of his left great toe for couple weeks. He felt like it was getting better. He tells me that he got his Covid booster on Friday and felt poorly on Saturday but then started to feel better until 5 PM today when he got chills and fever and tells me he feels weak. He took Aleve about 2 hours ago. He does not currently have a podiatrist to follow-up with. He has history of diabetes on oral medications including Metformin and Jardiance. Denies vomiting. Nursing Notes were reviewed and I agree. REVIEW OF SYSTEMS    (2-9 systems for level 4, 10 or more for level 5)     Review of Systems   Constitutional: Positive for chills, fatigue and fever. Respiratory: Negative for shortness of breath. Gastrointestinal: Negative for vomiting. Skin: Positive for color change and wound. Neurological: Negative for weakness and numbness. Psychiatric/Behavioral: Negative for agitation, behavioral problems and confusion. Except as noted above the remainder of the review of systems was reviewed and negative. PAST MEDICAL HISTORY         Diagnosis Date    Cellulitis     Diabetes mellitus ([address]        SURGICAL HISTORY     History reviewed. No pertinent surgical history. CURRENT MEDICATIONS       Previous Medications    ASPIRIN EC 81 MG EC TABLET    Take 81 mg by mouth daily    ATORVASTATIN (LIPITOR) 10 MG TABLET    Take 10 mg by mouth daily     EMPAGLIFLOZIN (JARDIANCE) 25 MG TABLET    Take 25 mg by mouth daily    LISINOPRIL (PRINIVIL;ZESTRIL) 5 MG TABLET    Take 5 mg by mouth daily     METFORMIN (GLUCOPHAGE) 1000 MG TABLET    Take by mouth See Admin Instructions 500 mg with Breakfast and 1000 mg with Dinner       ALLERGIES     Testosterone    FAMILY HISTORY     History reviewed. No pertinent family history. No family status information on file. SOCIAL HISTORY      reports that he has never smoked. He has never used smokeless tobacco. He reports that he does not drink alcohol and does not use drugs. PHYSICAL EXAM    (up to 7 for level 4, 8 or more for level 5)     ED Triage Vitals [02/27/22 2050]   BP Temp Temp Source Pulse Resp SpO2 Height Weight   (!) 165/84 100.1 °F (37.8 °C) Oral 115 18 99 % -- 237 lb 3.4 oz (107.6 kg)       Physical Exam  Vitals and nursing note reviewed. Constitutional:       Appearance: Normal appearance. He is not ill-appearing. HENT:      Head: Normocephalic and atraumatic. Eyes:      Pupils: Pupils are equal, round, and reactive to light. Cardiovascular:      Rate and Rhythm: Normal rate. Pulses: Normal pulses. Musculoskeletal:      Left foot: Normal capillary refill. Normal pulse. Feet:    Skin:     General: Skin is warm.       Findings: Erythema present. Neurological:      General: No focal deficit present. Mental Status: He is alert and oriented to person, place, and time. Psychiatric:         Mood and Affect: Mood normal.         Behavior: Behavior normal.         DIAGNOSTIC RESULTS     EKG: All EKG's are interpreted by LILIAN Hart in the absence of a cardiologist.    EKG interpreted by myself - please refer to attending physician's note for complete EKG interpretation:    No evidence of acute ischemia or injury. RADIOLOGY:   Non-plain film images such as CT, Ultrasound and MRI are read by the radiologist. Plain radiographic images are visualized and preliminarily interpreted by LILIAN Hart with the below findings:    Reviewed radiologist's interpretation. Interpretation per the Radiologist below, if available at the time of this note:    XR FOOT LEFT (MIN 3 VIEWS)   Final Result   No fracture, cortical erosion, or periosteal reaction are identified.                LABS:  Labs Reviewed   CBC WITH AUTO DIFFERENTIAL - Abnormal; Notable for the following components:       Result Value    WBC 14.0 (*)     Neutrophils Absolute 12.3 (*)     Lymphocytes Absolute 0.6 (*)     All other components within normal limits    Narrative:     Performed at:  [facility]  [address] [facility] [address]   Phone [phone]   COMPREHENSIVE METABOLIC PANEL W/ REFLEX TO MG FOR LOW K - Abnormal; Notable for the following components:    Sodium 129 (*)     Chloride 96 (*)     CO2 19 (*)     Glucose 244 (*)     CREATININE 0.5 (*)     Total Bilirubin 1.3 (*)     All other components within normal limits    Narrative:     Performed at:  [facility]  [address] [place], [place] [address]   Phone [phone]   CULTURE, BLOOD 1   CULTURE, BLOOD 2   LACTIC ACID    Narrative:     Performed at:  [facility]  [address] [facility] [facility] [place] [address]   Phone [phone]       All other labs were within normal range or not returned as of this dictation. EMERGENCY DEPARTMENT COURSE and DIFFERENTIAL DIAGNOSIS/MDM:   Vitals:    Vitals:    02/27/22 2050 02/28/22 0015   BP: (!) 165/84 138/84   Pulse: 115 112   Resp: 18 14   Temp: 100.1 °F (37.8 °C)    TempSrc: Oral    SpO2: 99% 98%   Weight: 237 lb 3.4 oz (107.6 kg)      Patient presents with a temperature of 100.1 °F, tachycardic not hypoxic. Has a left great toe base pad wound with some cellulitis extending across the toes. Pulses intact in the foot. No osteomyelitis on x-ray. White count elevated but lactic acid normal.  Given fluids, broad-spectrum IV antibiotics, tetanus shot. Patient and wife agreeable to admission. CONSULTS:  IP CONSULT TO PHARMACY  IP CONSULT TO HOSPITALIST  IP CONSULT TO PODIATRY  PHARMACY TO DOSE VANCOMYCIN    PROCEDURES:  Procedures      FINAL IMPRESSION      1. Diabetic foot infection ([facility] [address]    2. Septicemia ([facility] [address]          DISPOSITION/PLAN   DISPOSITION Admitted 02/28/2022 12:02:33 AM      PATIENT REFERRED TO:  No follow-up provider specified.     DISCHARGE MEDICATIONS:  New Prescriptions    No medications on file       (Please note that portions of this note were completed with a voice recognition program.  Efforts were made to edit the dictations but occasionally words are mis-transcribed.)    Nabeel Barkley, [address], [place]  02/28/22 0030

## 2022-02-28 NOTE — H&P
Hospital Medicine History & Physical      PCP: Judy Page    Date of Admission: 2/27/2022    Date of Service: Pt seen/examined on 2/28/2022 and Admitted to Inpatient     Chief Complaint: Left foot redness and swelling      History Of Present Illness: The patient is a 56 y.o. male who presents to [facility] with PMHx: Cellulitis, DM    Lives at home with his wife Cony Hanson. H POA: Wife Cony Hanson: [phone]  CODE STATUS full  No anticoagulation therapy    Patient presented to the emergency department for evaluation of a left foot infection. He states that he has been doing a lot of walking at the gym and exercising. And I believe he said last week he noticed a blister on the plantar surface of the foot about quarter size was nonpainful and would bleed. He would keep it covered. However his wife kept  inspecting it and noticed that it was becoming red and more swollen and concerning. He also started developing chills, sweats and nausea this evening. ED work-up IVF, x-ray obtained no evidence of left foot gas. Started on vancomycin and cefepime. On my exam patient is awake and alert oriented. Remains mildly tachycardic at a rate of 108 appears to be sinus. Blood pressure stable. Left foot erythematous on the plantar surface around the first metatarsal extending to the dorsal surface. There is a quarter sized flat spongy erosive area plantar surface first metatarsal.  It is surrounded by callused nontender nonboggy nonfluctuant skin that is actually starting to peel. No evidence of necrosis or eschar. Past Medical History:        Diagnosis Date    Cellulitis     Diabetes mellitus ([facility] [address]        Past Surgical History:    History reviewed. No pertinent surgical history.     Medications Prior to Admission:    Prior to Admission medications Medication Sig Start Date End Date Taking? Authorizing Provider   empagliflozin (JARDIANCE) 25 MG tablet Take 25 mg by mouth daily   Yes Historical Provider, MD   aspirin EC 81 MG EC tablet Take 81 mg by mouth daily   Yes Historical Provider, MD   metFORMIN (GLUCOPHAGE) 1000 MG tablet Take by mouth See Admin Instructions 500 mg with Breakfast and 1000 mg with Dinner 11/29/15  Yes Historical Provider, MD   lisinopril (PRINIVIL;ZESTRIL) 5 MG tablet Take 5 mg by mouth daily  11/29/15  Yes Historical Provider, MD   atorvastatin (LIPITOR) 10 MG tablet Take 10 mg by mouth daily  11/29/15  Yes Historical Provider, MD       Allergies:  Testosterone    Social History:  The patient currently lives home. TOBACCO:   reports that he has never smoked. He has never used smokeless tobacco.  ETOH:   reports no history of alcohol use. Family History:  Reviewed in detail and negative for DM, Early CAD, Cancer, CVA. Positive as follows:    History reviewed. No pertinent family history. REVIEW OF SYSTEMS:    as noted in the HPI. All other systems reviewed and negative. PHYSICAL EXAM:    /84   Pulse 112   Temp 100.1 °F (37.8 °C) (Oral)   Resp 14   Wt 237 lb 3.4 oz (107.6 kg)   SpO2 98%   BMI 32.17 kg/m²     General appearance: No apparent distress appears stated age and cooperative. HEENT Normal cephalic, atraumatic   Neck: Supple, No jugular venous distention/bruits. Trachea midline without thyromegaly or adenopathy with full range of motion. Lungs: Clear to auscultation, bilaterally without Rales/Wheezes/Rhonchi with good respiratory effort. Heart: Regular rate and rhythm with Normal S1/S2 without murmurs, rubs or gallops, point of maximum impulse non-displaced  Abdomen: Soft, non-tender or non-distended without rigidity or guarding and positive bowel sounds all four quadrants. Extremities: No clubbing, cyanosis, or edema bilaterally.   Full range of motion without deformity and normal gait intact. Skin: Skin color, texture, turgor normal.  No rashes or lesions. Left foot exam noted above  Neurologic: Alert and oriented X 3, neurovascularly intact with sensory/motor intact upper extremities/lower extremities, bilaterally. Cranial nerves: II-XII intact, grossly non-focal.  Mental status: Alert, oriented, thought content appropriate. Capillary Refill: Acceptable  < 3 seconds  Peripheral Pulses: +3 Easily felt, not easily obliterated with pressure      CXR:  I have reviewed the CXR with the following interpretation: N/A  EKG:  I have reviewed the EKG with the following interpretation: Narrow complex sinus tachycardia rate 108. NC interval 182, ,     CBC   Recent Labs     02/27/22 2123   WBC 14.0*   HGB 15.3   HCT 44.0         RENAL  Recent Labs     02/27/22 2123   *   K 3.9   CL 96*   CO2 19*   BUN 15   CREATININE 0.5*     LFT'S  Recent Labs     02/27/22 2123   AST 19   ALT 22   BILITOT 1.3*   ALKPHOS 92     COAG  No results for input(s): INR in the last 72 hours. CARDIAC ENZYMES  No results for input(s): CKTOTAL, CKMB, CKMBINDEX, TROPONINI in the last 72 hours. U/A:  No results found for: NITRITE, COLORU, WBCUA, RBCUA, MUCUS, BACTERIA, CLARITYU, SPECGRAV, LEUKOCYTESUR, BLOODU, GLUCOSEU, AMORPHOUS    ABG  No results found for: CER2QIW, BEART, G9TXZUIZ, PHART, THGBART, UHQ3RRK, PO2ART, JLS2PWZ        Active Hospital Problems    Diagnosis Date Noted    Sepsis ([facility] [A41.9] 02/28/2022    Cellulitis of foot, left [L03.116] 02/28/2022         PHYSICIANS CERTIFICATION:    I certify that Amina Pcukett is expected to be hospitalized for more than 2 midnights based on the following assessment and plan:      ASSESSMENT/PLAN:    Sepsis: 2/2-> diabetic foot infection/cellulitis  Mildly febrile, tachycardic. No encephalopathy. No shock.   Lactic acid 1.4.->  Trend  WBC 14.0 with a left shift 12.3  Bilirubin 1.3  IV fluids continue  Blood cultures x2  Meropenem and vancomycin initiated in ED-> continue on admit    Left Foot infection/cellulitis: Started as a first metatarsal plantar surface blister last week  X-ray obtained negative for fluid collection, soft tissue gas or osteomyelitis  Continue cefepime and meropenem  Podiatry consulted  Wound care consult    DM: uncontrolled:244  Hemoglobin A1c in the a.m. Hold home regimen: Metformin and Jardiance  SSI, DM diet, hypoglycemia protocol, Accu-Cheks    Metabolic acidosis: CO2 19. AG within normal limits: 14  Continue to monitor, will correct with glucose correction and fluid    Mild hyponatremia: 129: Pseudohyponatremia secondary to uncontrolled hyperglycemia  Continue to monitor        DVT Prophylaxis: Lovenox  Diet: No diet orders on file  Code Status: Prior  PT/OT Eval Status: Independent    Dispo -admit, inpatient       Abhinav Dee, TERESA - CNP    Thank you Mark Anthony Tang for the opportunity to be involved in this patient's care. If you have any questions or concerns please feel free to contact me at [phone]. This dictation was performed with a verbal recognition program (DRAGON) and it was checked for errors. It is possible that there are still dictated errors within this office note. If so, please bring any errors to my attention for an addendum. All efforts were made to ensure that this office note is accurate.

## 2022-02-28 NOTE — CONSULTS
Clinical Pharmacy Note  Vancomycin Consult    Pharmacy consult received for one-time dose of vancomycin in the Emergency Department per LILIAN Govea. Ht Readings from Last 1 Encounters:   12/28/21 6' (1.829 m)        Wt Readings from Last 1 Encounters:   02/27/22 237 lb 3.4 oz (107.6 kg)         Assessment/Plan:   Vancomycin 1500 mg x 1 in ED.  If Vancomycin is to continue on admission and pharmacy is to manage dosing, please re-consult with admission orders.     Jas Zhang, PharmD

## 2022-02-28 NOTE — CARE COORDINATION
Wound care consulted for diabetic foot wound. Podiatry consulted. Please refer to podiatry for all wound care orders and management. Will not continue to follow. Please re-consult if needed.   Electronically signed by Mickie Gonzalez RN on 2/28/2022 at 10:07 AM

## 2022-02-28 NOTE — PROGRESS NOTES
Patient is alert & oriented x4, pt UAL and stable on feet, 2/4 bed rails up, bed in lowest position, fall precautions in place, call light within reach. Morning assessment complete. Morning medications given. Wife at bedside. Spoke to Dr. Trina Hoffman on the phone- states will round on patient later when he is on the floor. Pt states feeling better but does feel \"out of it\" still. Will cont to monitor and reassess.   Electronically signed by Arnoldo Piedra RN on 2/28/2022 at 9:56 AM

## 2022-03-01 ENCOUNTER — APPOINTMENT (OUTPATIENT)
Dept: MRI IMAGING | Age: 57
DRG: 872 | End: 2022-03-01
Payer: COMMERCIAL

## 2022-03-01 LAB
ANION GAP SERPL CALCULATED.3IONS-SCNC: 17 MMOL/L (ref 3–16)
BASOPHILS ABSOLUTE: 0 K/UL (ref 0–0.2)
BASOPHILS RELATIVE PERCENT: 0.3 %
BUN BLDV-MCNC: 13 MG/DL (ref 7–20)
CALCIUM SERPL-MCNC: 8.7 MG/DL (ref 8.3–10.6)
CHLORIDE BLD-SCNC: 100 MMOL/L (ref 99–110)
CO2: 16 MMOL/L (ref 21–32)
CREAT SERPL-MCNC: 0.6 MG/DL (ref 0.9–1.3)
EOSINOPHILS ABSOLUTE: 0 K/UL (ref 0–0.6)
EOSINOPHILS RELATIVE PERCENT: 0.3 %
GFR AFRICAN AMERICAN: >60
GFR NON-AFRICAN AMERICAN: >60
GLUCOSE BLD-MCNC: 100 MG/DL (ref 70–99)
GLUCOSE BLD-MCNC: 101 MG/DL (ref 70–99)
GLUCOSE BLD-MCNC: 112 MG/DL (ref 70–99)
GLUCOSE BLD-MCNC: 112 MG/DL (ref 70–99)
GLUCOSE BLD-MCNC: 119 MG/DL (ref 70–99)
GLUCOSE BLD-MCNC: 128 MG/DL (ref 70–99)
HCT VFR BLD CALC: 39 % (ref 40.5–52.5)
HEMOGLOBIN: 13.8 G/DL (ref 13.5–17.5)
LYMPHOCYTES ABSOLUTE: 1 K/UL (ref 1–5.1)
LYMPHOCYTES RELATIVE PERCENT: 11.3 %
MCH RBC QN AUTO: 31.2 PG (ref 26–34)
MCHC RBC AUTO-ENTMCNC: 35.4 G/DL (ref 31–36)
MCV RBC AUTO: 88.1 FL (ref 80–100)
MONOCYTES ABSOLUTE: 1.1 K/UL (ref 0–1.3)
MONOCYTES RELATIVE PERCENT: 11.6 %
NEUTROPHILS ABSOLUTE: 7.1 K/UL (ref 1.7–7.7)
NEUTROPHILS RELATIVE PERCENT: 76.5 %
PDW BLD-RTO: 13.3 % (ref 12.4–15.4)
PERFORMED ON: ABNORMAL
PLATELET # BLD: 145 K/UL (ref 135–450)
PMV BLD AUTO: 7.4 FL (ref 5–10.5)
POTASSIUM REFLEX MAGNESIUM: 3.8 MMOL/L (ref 3.5–5.1)
PROCALCITONIN: 0.61 NG/ML (ref 0–0.15)
RBC # BLD: 4.43 M/UL (ref 4.2–5.9)
SODIUM BLD-SCNC: 133 MMOL/L (ref 136–145)
WBC # BLD: 9.3 K/UL (ref 4–11)

## 2022-03-01 PROCEDURE — 1200000000 HC SEMI PRIVATE

## 2022-03-01 PROCEDURE — 2580000003 HC RX 258: Performed by: FAMILY MEDICINE

## 2022-03-01 PROCEDURE — 84145 PROCALCITONIN (PCT): CPT

## 2022-03-01 PROCEDURE — 6370000000 HC RX 637 (ALT 250 FOR IP): Performed by: NURSE PRACTITIONER

## 2022-03-01 PROCEDURE — 6360000002 HC RX W HCPCS: Performed by: FAMILY MEDICINE

## 2022-03-01 PROCEDURE — 80048 BASIC METABOLIC PNL TOTAL CA: CPT

## 2022-03-01 PROCEDURE — 87040 BLOOD CULTURE FOR BACTERIA: CPT

## 2022-03-01 PROCEDURE — 2580000003 HC RX 258: Performed by: NURSE PRACTITIONER

## 2022-03-01 PROCEDURE — 94761 N-INVAS EAR/PLS OXIMETRY MLT: CPT

## 2022-03-01 PROCEDURE — 73718 MRI LOWER EXTREMITY W/O DYE: CPT

## 2022-03-01 PROCEDURE — 6360000002 HC RX W HCPCS: Performed by: NURSE PRACTITIONER

## 2022-03-01 PROCEDURE — 36415 COLL VENOUS BLD VENIPUNCTURE: CPT

## 2022-03-01 PROCEDURE — 85025 COMPLETE CBC W/AUTO DIFF WBC: CPT

## 2022-03-01 RX ADMIN — AMPICILLIN AND SULBACTAM 3000 MG: 1; 2 INJECTION, POWDER, FOR SOLUTION INTRAMUSCULAR; INTRAVENOUS at 14:13

## 2022-03-01 RX ADMIN — AMPICILLIN AND SULBACTAM 3000 MG: 1; 2 INJECTION, POWDER, FOR SOLUTION INTRAMUSCULAR; INTRAVENOUS at 20:02

## 2022-03-01 RX ADMIN — ATORVASTATIN CALCIUM 10 MG: 10 TABLET, FILM COATED ORAL at 20:01

## 2022-03-01 RX ADMIN — ASPIRIN 81 MG: 81 TABLET, COATED ORAL at 20:01

## 2022-03-01 RX ADMIN — ENOXAPARIN SODIUM 40 MG: 100 INJECTION SUBCUTANEOUS at 07:53

## 2022-03-01 RX ADMIN — AMPICILLIN AND SULBACTAM 3000 MG: 1; 2 INJECTION, POWDER, FOR SOLUTION INTRAMUSCULAR; INTRAVENOUS at 02:07

## 2022-03-01 RX ADMIN — SODIUM CHLORIDE, PRESERVATIVE FREE 10 ML: 5 INJECTION INTRAVENOUS at 07:53

## 2022-03-01 RX ADMIN — AMPICILLIN AND SULBACTAM 3000 MG: 1; 2 INJECTION, POWDER, FOR SOLUTION INTRAMUSCULAR; INTRAVENOUS at 07:55

## 2022-03-01 RX ADMIN — Medication: at 07:53

## 2022-03-01 RX ADMIN — SODIUM CHLORIDE, PRESERVATIVE FREE 10 ML: 5 INJECTION INTRAVENOUS at 21:00

## 2022-03-01 RX ADMIN — SODIUM CHLORIDE 25 ML: 9 INJECTION, SOLUTION INTRAVENOUS at 07:55

## 2022-03-01 RX ADMIN — LISINOPRIL 5 MG: 5 TABLET ORAL at 20:01

## 2022-03-01 ASSESSMENT — PAIN SCALES - GENERAL: PAINLEVEL_OUTOF10: 0

## 2022-03-01 NOTE — PROGRESS NOTES
Patient resting in bed at this time, no acute complaints, vital signs stable. Dressing to left foot clean, dry and intact, all needs met at this time, will continue to monitor.

## 2022-03-01 NOTE — PROGRESS NOTES
Hospitalist Progress Note      PCP: Vito Clement    Date of Admission: 2/27/2022    Chief Complaint: foot ulcer    Hospital Course:      Subjective: no foot pain       Medications:  Reviewed    Infusion Medications    sodium chloride 25 mL (02/28/22 1246)    dextrose       Scheduled Medications    aspirin EC  81 mg Oral Daily    atorvastatin  10 mg Oral Daily    lisinopril  5 mg Oral Daily    sodium chloride flush  5-40 mL IntraVENous 2 times per day    enoxaparin  40 mg SubCUTAneous Daily    insulin lispro  0-12 Units SubCUTAneous TID WC    insulin lispro  0-6 Units SubCUTAneous Nightly    medihoney wound/burn dressing   Topical Daily    ampicillin-sulbactam  3,000 mg IntraVENous Q6H     PRN Meds: sodium chloride flush, sodium chloride, acetaminophen **OR** acetaminophen, glucose, dextrose, glucagon (rDNA), dextrose      Intake/Output Summary (Last 24 hours) at 2/28/2022 2147  Last data filed at 2/28/2022 1748  Gross per 24 hour   Intake 840 ml   Output 600 ml   Net 240 ml       Exam:    BP (!) 148/78   Pulse 97   Temp 98.1 °F (36.7 °C) (Oral)   Resp 18   Ht 6' (1.829 m)   Wt 237 lb 3.4 oz (107.6 kg)   SpO2 97%   BMI 32.17 kg/m²     General appearance: No apparent distress, appears stated age and cooperative. HEENT: Pupils equal, round, and reactive to light. Conjunctivae/corneas clear. Neck: Supple, with full range of motion. No jugular venous distention. Trachea midline. Respiratory:  Normal respiratory effort. Clear to auscultation, bilaterally without Rales/Wheezes/Rhonchi. Cardiovascular: Regular rate and rhythm with normal S1/S2 without murmurs, rubs or gallops. Abdomen: Soft, non-tender, non-distended with normal bowel sounds. Musculoskeletal: No clubbing, cyanosis or edema bilaterally. Full range of motion without deformity.   Skin: Skin color, texture, turgor normal.  Left plantar ulcer on forefoot with mild bleeding  Neurologic:  Neurovascularly intact without any focal sensory/motor deficits. Cranial nerves: II-XII intact, grossly non-focal.  Psychiatric: Alert and oriented, thought content appropriate, normal insight  Capillary Refill: Brisk,< 3 seconds   Peripheral Pulses: +2 palpable, equal bilaterally       Labs:   Recent Labs     02/27/22 2123   WBC 14.0*   HGB 15.3   HCT 44.0        Recent Labs     02/27/22 2123   *   K 3.9   CL 96*   CO2 19*   BUN 15   CREATININE 0.5*   CALCIUM 8.9     Recent Labs     02/27/22 2123   AST 19   ALT 22   BILITOT 1.3*   ALKPHOS 92     No results for input(s): INR in the last 72 hours. No results for input(s): Sana Castor in the last 72 hours. Urinalysis:    No results found for: Arnaud Solar, BACTERIA, RBCUA, BLOODU, [address], [place] [address]    Radiology:  XR FOOT LEFT (MIN 3 VIEWS)   Final Result   No fracture, cortical erosion, or periosteal reaction are identified. MRI FOOT LEFT WO CONTRAST    (Results Pending)           Assessment/Plan:    Active Hospital Problems    Diagnosis Date Noted    Sepsis ([facility] [A41.9] 02/28/2022    Cellulitis of foot, left [L03.116] 02/28/2022    Uncontrolled type 2 diabetes mellitus with hyperglycemia ([facility] [address] [E11.65] 02/28/2022       Sepsis:   2/2-> diabetic foot infection/cellulitis  Blood cultures x2  Meropenem and vancomycin initiated in ED-> continue on admit     Left Foot infection/cellulitis:   Started as a first metatarsal plantar surface blister last week  MRI ordered  Continue cefepime and meropenem  Podiatry consulted  Wound care consult     DM: uncontrolled:  Hemoglobin A1c in the a.m. Hold home regimen: Metformin and Jardiance  SSI, DM diet, hypoglycemia protocol, Accu-Cheks     Metabolic acidosis:   Continue to monitor, will correct with glucose correction and fluid     Mild hyponatremia:   Pseudohyponatremia secondary to uncontrolled hyperglycemia  Continue to monitor    DVT Prophylaxis: lovenox  Diet: ADULT DIET;  Regular; 3 carb choices (45 gm/meal)  Code Status: Full Code    PT/OT Eval Status: n/a    Dispo - Haile Hernandez MD

## 2022-03-01 NOTE — PROGRESS NOTES
Department of Podiatry Progress Note  Marcelo Pisano DPM Attending     CHIEF COMPLAINT:  Ulceration of LEFT sub 1st MTPJ with extendning cellulitis to LEFT calf    HISTORY OF PRESENT ILLNESS:                The patient is a 56 y.o. male with significant past medical history of Diabetes with peripheral neuropathy who is consulted for open wound of LEFT sub 1st met-phalangeal joint of approximately 1 month's duration. Patient relates he began a work-out routine and noted blister to LEFT plantar foot that worsened to point of drainage, chills and malaise over last 3 days    Now much better resolve of malaise, afebrile    Past Medical History:        Diagnosis Date    Cellulitis     Diabetes mellitus ([address]      Past Surgical History:    History reviewed. No pertinent surgical history. Allergies:     Testosterone     REVIEW OF SYSTEMS:    CONSTITUTIONAL:  Resolved chills  INTEGUMENT/BREAST:  positive for skin color change, changes in lesion and changes in hair  ENDOCRINE:  positive for diabetic symptoms including neither foot ulcerations nor skin dryness nor neuropathy  MUSCULOSKELETAL:  positive for  joint swelling and decreased range of motion  NEUROLOGICAL:  positive for gait problems and numbness    PHYSICAL EXAM:      Vitals:    /77   Pulse 87   Temp 98 °F (36.7 °C) (Axillary)   Resp 16   Ht 6' (1.829 m)   Wt 234 lb 2.1 oz (106.2 kg)   SpO2 97%   BMI 31.75 kg/m²     LABS:   Recent Labs     02/27/22 2123 03/01/22  0543   WBC 14.0* 9.3   HGB 15.3 13.8   HCT 44.0 39.0*    145     Recent Labs     03/01/22  0543   *   K 3.8      CO2 16*   BUN 13   CREATININE 0.6*     Recent Labs     02/27/22 2123   PROT 7.0       LOWER EXTREMITY EXAMINATION   SKIN:    LEFT plantar foot with FULL thickness ulceration of 2.3 x 2.6 cm with depth of sub Q exposure with streaming pink erythema of dorsal foot to mid-calf.  No active drainage, no purulence        NEUROLOGIC:    Pain sensation isnot significantly changed Bilateral.  Light touch is decreasedLeft. positive history of paresthesia Bilateral. negativehistory of burning Bilateral. Deep tendon reflexes are 2 to include patellar and achilles Bilateral. [place]--Melyssa 5.07 monofilament sensitivity is abnormal 2 to 5 spots tested Bilateral.    VASCULAR:    bilaterally Dorsalis pedis is 2. bilaterally Posterior tibial pulse is 2. 1+ edema left. none Varicosities bilaterally. MUSCULOSKELETAL:  [place] is antalgic due to open wound of LEFT plantar foot. Muscle strength is 5/5 to all groups tested to include dorsiflexion, plantarflexion, inversion, eversion, and digital Bilateral . The ranges of motion of all joints tested from ankle distal is decreased there is no crepitus noted Left. Radiographs: LEFT  No acute fracture or dislocation is demonstrated.  No cortical erosion or   periosteal reaction is seen. Downey Sack are mild degenerative changes along the   midfoot.  No collapse of the talus or calcaneus.  There are no radiopaque   foreign bodies.  There is no soft tissue emphysema.           Impression   No fracture, cortical erosion, or periosteal reaction are identified.             MRI  No evidence of acute osteomyelitis or drainable abscess.       Subtle soft tissue defect is seen at the plantar aspect of the 1st MTP joints.       Mild 1st MTP joint degenerative changes.       Mild marrow edema is seen in the hallux sesamoids.  Differential   considerations include degenerative change, early osteonecrosis, and   sesamoiditis             IMPRESSION/RECOMMENDATIONS    1. Cellulitis of LEFT leg attributed to open wound of plantar foot  +Afebrile with absence of leukocytosis, but not much decrease to erythema as marked 2/28    2. Diabetic Ulcer with infection, due to neuropathy  MUGS 3 ulcer LEFT  plantar foot  +Unasyn    3.  No soft-tissue gas    MRI NEGATIVE for abscess or osteomyelitis, continued IV antibiotic until recession of erythema to leg, conversion to orals for home going, with off-loading shoe, change to exercise program, follow up with Podiatry -- No surgery, debridement necessary. Please continue [address] applications daily    Thank you for the opportunity to take part in the patient's care.     Shayna Sidhu DPM  [facility]  [phone]

## 2022-03-01 NOTE — ACP (ADVANCE CARE PLANNING)
Rescitate prepared for Provider review and signature  [] POLST/POST/MOLST/MOST prepared for Provider review and signature      Follow-up plan:    [] Schedule follow-up conversation to continue planning  [] Referred individual to Provider for additional questions/concerns   [] Advised patient/agent/surrogate to review completed ACP document and update if needed with changes in condition, patient preferences or care setting    [x] This note routed to one or more involved healthcare providers    Electronically signed by Buddy Abbott RN on 3/1/2022 at 4:31 PM

## 2022-03-01 NOTE — PROGRESS NOTES
Hospitalist Progress Note      PCP: Nilda Richards    Date of Admission: 2/27/2022    Chief Complaint: foot ulcer    Hospital Course:      Subjective: Pt S/E. No acute events. His foot is feeling better. Medications:  Reviewed    Infusion Medications    sodium chloride 25 mL (02/28/22 1246)    dextrose       Scheduled Medications    aspirin EC  81 mg Oral Daily    atorvastatin  10 mg Oral Daily    lisinopril  5 mg Oral Daily    sodium chloride flush  5-40 mL IntraVENous 2 times per day    enoxaparin  40 mg SubCUTAneous Daily    insulin lispro  0-12 Units SubCUTAneous TID WC    insulin lispro  0-6 Units SubCUTAneous Nightly    medihoney wound/burn dressing   Topical Daily    ampicillin-sulbactam  3,000 mg IntraVENous Q6H     PRN Meds: sodium chloride flush, sodium chloride, acetaminophen **OR** acetaminophen, glucose, dextrose, glucagon (rDNA), dextrose      Intake/Output Summary (Last 24 hours) at 3/1/2022 0751  Last data filed at 3/1/2022 1619  Gross per 24 hour   Intake 1280 ml   Output --   Net 1280 ml       Exam:    /77   Pulse 87   Temp 98 °F (36.7 °C) (Axillary)   Resp 16   Ht 6' (1.829 m)   Wt 234 lb 2.1 oz (106.2 kg)   SpO2 97%   BMI 31.75 kg/m²     General appearance: No apparent distress, appears stated age and cooperative. HEENT: Pupils equal, round, and reactive to light. Conjunctivae/corneas clear. Neck: Supple, with full range of motion. No jugular venous distention. Trachea midline. Respiratory:  Normal respiratory effort. Clear to auscultation, bilaterally without Rales/Wheezes/Rhonchi. Cardiovascular: Regular rate and rhythm with normal S1/S2 without murmurs, rubs or gallops. Abdomen: Soft, non-tender, non-distended with normal bowel sounds. Musculoskeletal: No clubbing, cyanosis or edema bilaterally. Full range of motion without deformity.    Skin: Skin color, texture, turgor normal.  Left plantar ulcer on forefoot, no drainage, decreased DIET; Regular; 3 carb choices (45 gm/meal)  Code Status: Full Code    PT/OT Eval Status: n/a    Dispo - MedSurg    Veena Griffith DO

## 2022-03-01 NOTE — PLAN OF CARE
Problem: Falls - Risk of:  Goal: Will remain free from falls  Description: Will remain free from falls  3/1/2022 0720 by Jo Sheth RN  Outcome: Ongoing  Note: Fall risk assessment completed. Fall precautions in place. Bed in lowest position, wheels locked, bed/chair exit alarm in place, call light within reach, and non skid footwear on. Walkway free of clutter. Pt alert and oriented and able to make needs known. Pt educated to use call light when needing to get up, and pt utilizes call light to make needs known. Will continue to monitor. Problem: Falls - Risk of:  Goal: Absence of physical injury  Description: Absence of physical injury  3/1/2022 0720 by Jo Sheth RN  Outcome: Ongoing  Note: Pt absent from physical injury      Problem: Pain:  Goal: Pain level will decrease  Description: Pain level will decrease  3/1/2022 0720 by Jo Sheth RN  Outcome: Ongoing  Note: Assessing and monitoring pt's pain. PRN pain medication being given if ordered. Educating pt on nonpharmacologic interventions for pain control. Will continue to monitor and reassess. Problem: Pain:  Goal: Control of acute pain  Description: Control of acute pain  3/1/2022 0720 by Jo Sheth RN  Outcome: Ongoing  Note: Assessing and monitoring pt's pain. PRN pain medication being given if ordered. Educating pt on nonpharmacologic interventions for pain control. Will continue to monitor and reassess. Problem: Pain:  Goal: Control of chronic pain  Description: Control of chronic pain  3/1/2022 0720 by Jo Sheth RN  Outcome: Ongoing  Note: Assessing and monitoring pt's pain. PRN pain medication being given if ordered. Educating pt on nonpharmacologic interventions for pain control. Will continue to monitor and reassess.

## 2022-03-01 NOTE — CARE COORDINATION
INITIAL CASE MANAGEMENT ASSESSMENT    Reviewed chart, met with patient to assess possible discharge needs. Explained Case Management role/services. Living Situation: Patient lives in a private residence with his wife and three boys. ADLs: Prior to medical admission, patient was independent with all ADLs. Patient does not anticipate any needs upon discharge. DME: Prior to medical admission, patient did not have any DME needs. Patient does not anticipate any needs upon discharge. PT/OT Recs: Not ordered at this time. Active Services: N/A     Transportation: Patient a licensed . Medications: [facility], [address], Counsel[place], [facility]    PCP: Alicia Gates      HD/PD: N/A    PLAN/COMMENTS: Patient to follow up with bedside nurse and podiatrist to clarify foot care and follow up appointment times once outpatient. Patient denies and further needs at this time. SW/CM provided contact information for patient or family to call with any questions. SW/CM will follow and assist as needed.     Electronically signed by Reid Monson RN on 3/1/2022 at 4:26 PM

## 2022-03-01 NOTE — PLAN OF CARE
Problem: Falls - Risk of:  Goal: Will remain free from falls  Description: Will remain free from falls  Note: Patient will remain free from falls  Goal: Absence of physical injury  Description: Absence of physical injury  Note: Patient will be absent of physical injury     Problem: Pain:  Goal: Pain level will decrease  Description: Pain level will decrease  Note: Patients pain level will decrease  Goal: Control of acute pain  Description: Control of acute pain  Note: Patient will have control of acute pain  Goal: Control of chronic pain  Description: Control of chronic pain  Note: Patient will have control of chronic pain

## 2022-03-01 NOTE — PLAN OF CARE
Problem: Falls - Risk of:  Goal: Will remain free from falls  Description: Will remain free from falls  3/1/2022 0744 by Lawayne Skiff, RN  Outcome: Ongoing  Note: Fall risk assessment completed. Fall precautions in place. Bed in lowest position, wheels locked, bed/chair exit alarm in place, call light within reach, and non skid footwear on. Walkway free of clutter. Pt alert and oriented and able to make needs known. Pt educated to use call light when needing to get up, and pt utilizes call light to make needs known. Will continue to monitor. Problem: Falls - Risk of:  Goal: Absence of physical injury  Description: Absence of physical injury  3/1/2022 0744 by Lawayne Skiff, RN  Outcome: Ongoing  Note: Pt absent from physical injury      Problem: Pain:  Goal: Pain level will decrease  Description: Pain level will decrease  3/1/2022 0744 by Lawayne Skiff, RN  Outcome: Ongoing  Note: Assessing and monitoring pt's pain. PRN pain medication being given if ordered. Educating pt on nonpharmacologic interventions for pain control. Will continue to monitor and reassess. Problem: Pain:  Goal: Control of acute pain  Description: Control of acute pain  3/1/2022 0744 by Lawayne Skiff, RN  Outcome: Ongoing  Note: Assessing and monitoring pt's pain. PRN pain medication being given if ordered. Educating pt on nonpharmacologic interventions for pain control. Will continue to monitor and reassess. Problem: Pain:  Goal: Control of chronic pain  Description: Control of chronic pain  3/1/2022 0744 by Lawayne Skiff, RN  Outcome: Ongoing  Note: Assessing and monitoring pt's pain. PRN pain medication being given if ordered. Educating pt on nonpharmacologic interventions for pain control. Will continue to monitor and reassess.

## 2022-03-01 NOTE — PROGRESS NOTES
Patient is alert & oriented x4, pt ual, 2/4 bed rails up, bed in lowest position, fall precautions in place, call light within reach. Morning assessment complete. Morning medications given. Left foot dressing changed at this time. Will cont to monitor and reassess.   Electronically signed by Yuli Gonsalez RN on 3/1/2022 at 9:13 AM

## 2022-03-02 VITALS
TEMPERATURE: 98.2 F | OXYGEN SATURATION: 98 % | SYSTOLIC BLOOD PRESSURE: 158 MMHG | DIASTOLIC BLOOD PRESSURE: 106 MMHG | HEIGHT: 72 IN | HEART RATE: 83 BPM | WEIGHT: 229.06 LBS | BODY MASS INDEX: 31.03 KG/M2 | RESPIRATION RATE: 18 BRPM

## 2022-03-02 LAB
BLOOD CULTURE, ROUTINE: ABNORMAL
BLOOD CULTURE, ROUTINE: ABNORMAL
GLUCOSE BLD-MCNC: 102 MG/DL (ref 70–99)
GLUCOSE BLD-MCNC: 103 MG/DL (ref 70–99)
ORGANISM: ABNORMAL
ORGANISM: ABNORMAL
PERFORMED ON: ABNORMAL
PERFORMED ON: ABNORMAL
PROCALCITONIN: 0.35 NG/ML (ref 0–0.15)

## 2022-03-02 PROCEDURE — 2580000003 HC RX 258: Performed by: FAMILY MEDICINE

## 2022-03-02 PROCEDURE — 84145 PROCALCITONIN (PCT): CPT

## 2022-03-02 PROCEDURE — 94760 N-INVAS EAR/PLS OXIMETRY 1: CPT

## 2022-03-02 PROCEDURE — 36415 COLL VENOUS BLD VENIPUNCTURE: CPT

## 2022-03-02 PROCEDURE — 6360000002 HC RX W HCPCS: Performed by: FAMILY MEDICINE

## 2022-03-02 PROCEDURE — 2580000003 HC RX 258: Performed by: NURSE PRACTITIONER

## 2022-03-02 PROCEDURE — 6360000002 HC RX W HCPCS: Performed by: NURSE PRACTITIONER

## 2022-03-02 PROCEDURE — 6370000000 HC RX 637 (ALT 250 FOR IP): Performed by: FAMILY MEDICINE

## 2022-03-02 RX ORDER — SACCHAROMYCES BOULARDII 250 MG
250 CAPSULE ORAL 2 TIMES DAILY
Qty: 40 CAPSULE | Refills: 0 | Status: SHIPPED | OUTPATIENT
Start: 2022-03-02 | End: 2022-03-22

## 2022-03-02 RX ORDER — CEPHALEXIN 500 MG/1
500 CAPSULE ORAL 4 TIMES DAILY
Qty: 40 CAPSULE | Refills: 0 | Status: SHIPPED | OUTPATIENT
Start: 2022-03-02 | End: 2022-03-12

## 2022-03-02 RX ORDER — LACTOBACILLUS RHAMNOSUS GG 10B CELL
2 CAPSULE ORAL ONCE
Status: COMPLETED | OUTPATIENT
Start: 2022-03-02 | End: 2022-03-02

## 2022-03-02 RX ADMIN — SODIUM CHLORIDE 25 ML: 9 INJECTION, SOLUTION INTRAVENOUS at 09:06

## 2022-03-02 RX ADMIN — AMPICILLIN AND SULBACTAM 3000 MG: 1; 2 INJECTION, POWDER, FOR SOLUTION INTRAMUSCULAR; INTRAVENOUS at 01:44

## 2022-03-02 RX ADMIN — SODIUM CHLORIDE 25 ML: 9 INJECTION, SOLUTION INTRAVENOUS at 10:30

## 2022-03-02 RX ADMIN — Medication: at 09:00

## 2022-03-02 RX ADMIN — AMPICILLIN AND SULBACTAM 3000 MG: 1; 2 INJECTION, POWDER, FOR SOLUTION INTRAMUSCULAR; INTRAVENOUS at 09:06

## 2022-03-02 RX ADMIN — Medication 1500 MG: at 10:30

## 2022-03-02 RX ADMIN — SODIUM CHLORIDE, PRESERVATIVE FREE 10 ML: 5 INJECTION INTRAVENOUS at 09:00

## 2022-03-02 RX ADMIN — ENOXAPARIN SODIUM 40 MG: 100 INJECTION SUBCUTANEOUS at 09:00

## 2022-03-02 RX ADMIN — Medication 2 CAPSULE: at 12:20

## 2022-03-02 ASSESSMENT — PAIN DESCRIPTION - PAIN TYPE: TYPE: ACUTE PAIN

## 2022-03-02 ASSESSMENT — PAIN DESCRIPTION - ONSET: ONSET: ON-GOING

## 2022-03-02 ASSESSMENT — PAIN DESCRIPTION - LOCATION: LOCATION: FOOT

## 2022-03-02 ASSESSMENT — PAIN SCALES - GENERAL: PAINLEVEL_OUTOF10: 2

## 2022-03-02 ASSESSMENT — PAIN DESCRIPTION - PROGRESSION: CLINICAL_PROGRESSION: NOT CHANGED

## 2022-03-02 ASSESSMENT — PAIN DESCRIPTION - FREQUENCY: FREQUENCY: CONTINUOUS

## 2022-03-02 ASSESSMENT — PAIN DESCRIPTION - ORIENTATION: ORIENTATION: LEFT

## 2022-03-02 ASSESSMENT — PAIN DESCRIPTION - DESCRIPTORS: DESCRIPTORS: DISCOMFORT

## 2022-03-02 ASSESSMENT — PAIN - FUNCTIONAL ASSESSMENT: PAIN_FUNCTIONAL_ASSESSMENT: PREVENTS OR INTERFERES SOME ACTIVE ACTIVITIES AND ADLS

## 2022-03-02 NOTE — CONSULTS
Clinical Pharmacy Note  Vancomycin Consult    Ailyn Cline is a 56 y.o. male ordered Vancomycin for cellulitis; consult received from Dr. Danelle Reich to manage therapy. Also receiving Unasyn. Patient Active Problem List   Diagnosis    Cellulitis    [facility] to [facility]ulitis ([facility] [address]    DM2 (diabetes mellitus, type 2) ([facility] [address] [facility]    [facility]    Cellulitis of foot, left    Uncontrolled type 2 diabetes mellitus with hyperglycemia (H[facility])       Allergies:  Testosterone     Temp max:  Temp (24hrs), Av.5 °F (36.9 °C), Min:98.2 °F (36.8 °C), Max:98.8 °F (37.1 °C)      Recent Labs     22  0543   WBC 14.0* 9.3       Recent Labs     22  0543   BUN 15 13   CREATININE 0.5* 0.6*         Intake/Output Summary (Last 24 hours) at 3/2/2022 0759  Last data filed at 3/2/2022 1910  Gross per 24 hour   Intake 1984.36 ml   Output --   Net 1984.36 ml       Culture Results:      Ht Readings from Last 1 Encounters:   22 6' (1.829 m)        Wt Readings from Last 1 Encounters:   22 229 lb 0.9 oz (103.9 kg)         Estimated Creatinine Clearance: 173 mL/min (A) (based on SCr of 0.6 mg/dL (L)). Assessment/Plan:  Vancomycin 1500 mg IV every 12 hours ordered. Regimen projects a trough level of 15 mg/L. Level ordered for 2100 3/3 prior to 4th dose. .    Thank you for the consult.    Marie Young, [facility], [address] [place] [address] 3/2/2022 7:59 AM

## 2022-03-02 NOTE — PROGRESS NOTES
CLINICAL PHARMACY NOTE: MEDS TO BEDS    Total # of Prescriptions Filled: 3   The following medications were delivered to the patient:  Current Discharge Medication List      START taking these medications    Details   Wound Dressings ([facility] WOUND/BURN DRESSING) GEL gel Apply 1 each topically daily  Qty: 44 mL, Refills: 0      cephALEXin (KEFLEX) 500 MG capsule Take 1 capsule by mouth 4 times daily for 10 days  Qty: 40 capsule, Refills: 0      saccharomyces boulardii (FLORASTOR) 250 MG capsule Take 1 capsule by mouth 2 times daily for 20 days  Qty: 40 capsule, Refills: 0         ·   ·     Additional Documentation:

## 2022-03-02 NOTE — DISCHARGE SUMMARY
Hospital Medicine Discharge Summary    Patient: Yuli Dee     Gender: male  : 1965   Age: 56 y.o. MRN: [id number]    Code Status: Full Code     Primary Care Provider: Darien Dillon Date: 2022   Discharge Date:   3/2/2022    Admitting Physician: Paulo Dee DO  Discharge Physician: Jens Barrera DO     Discharge Diagnoses: Active Hospital Problems    Diagnosis Date Noted    Sepsis ([facility] [address] [A41.9] 2022    Cellulitis of foot, left [L03.116] 2022    Uncontrolled type 2 diabetes mellitus with hyperglycemia ([facility] [address] [E11.65] 2022       Hospital Course:   A 57 yo male with dm, cellulitis, admitted  for evaluation of a left foot infection. ED work-up IVF, x-ray obtained no evidence of left foot gas. Started on vancomycin and cefepime. Work up completed, and improved with treatment as below. was discharged today in stable condition. Sepsis: resolved, afebrile and leukocytosis resolved   Left Foot infection/cellulitis:    2/2-> diabetic foot infection/cellulitis  Blood cultures with gbs in 1/2 bottles  - repeat blood cultures sent 3/1 - ngtd  Meropenem and vancomycin -> unasyn. Discharged with keflex  MRI without osteomyelitis  Podiatry consulted     DM: uncontrolled:  Hemoglobin A1c 7.7  cont Metformin, cont Jardiance       Mild hyponatremia: improved  Pseudohyponatremia secondary to uncontrolled hyperglycemia    Disposition:  Home    Exam:     BP (!) 158/106   Pulse 83   Temp 98.2 °F (36.8 °C) (Oral)   Resp 18   Ht 6' (1.829 m)   Wt 229 lb 0.9 oz (103.9 kg)   SpO2 98%   BMI 31.07 kg/m²     General appearance:  No apparent distress, appears stated age and cooperative. HEENT:  Normal cephalic, atraumatic without obvious deformity. Pupils equal, round, and reactive to light. Extra ocular muscles intact. Conjunctivae/corneas clear. Neck: Supple, with full range of motion. No jugular venous distention. Trachea midline.   Respiratory:  Normal respiratory effort. Clear to auscultation, bilaterally without Rales/Wheezes/Rhonchi. Cardiovascular:  Regular rate and rhythm with normal S1/S2 without murmurs, rubs or gallops. Abdomen: Soft, non-tender, non-distended with normal bowel sounds. Musculoskeletal:  No clubbing, cyanosis or edema bilaterally. Full range of motion without deformity. Skin: Skin color, texture, turgor normal. Left foot ulcer with decreased erythema, no drainage. Left lower leg erythema also improving, with decreased tenderness  Neurologic:  Neurovascularly intact without any focal sensory/motor deficits. Cranial nerves: II-XII intact, grossly non-focal.  Psychiatric:  Alert and oriented, thought content appropriate, normal insight    Consults:     IP CONSULT TO PHARMACY  IP CONSULT TO HOSPITALIST  IP CONSULT TO PODIATRY  PHARMACY TO DOSE VANCOMYCIN    Labs: For convenience and continuity at follow-up the following most recent labs are provided:    Lab Results   Component Value Date    WBC 9.3 03/01/2022    HGB 13.8 03/01/2022    HCT 39.0 03/01/2022    MCV 88.1 03/01/2022     03/01/2022     03/01/2022    K 3.8 03/01/2022     03/01/2022    CO2 16 03/01/2022    BUN 13 03/01/2022    CREATININE 0.6 03/01/2022    CALCIUM 8.7 03/01/2022    ALKPHOS 92 02/27/2022    ALT 22 02/27/2022    AST 19 02/27/2022    BILITOT 1.3 02/27/2022    LABALBU 4.1 02/27/2022     No results found for: INR    Radiology:  MRI FOOT LEFT WO CONTRAST   Final Result   No evidence of acute osteomyelitis or drainable abscess. Subtle soft tissue defect is seen at the plantar aspect of the 1st MTP joints. Mild 1st MTP joint degenerative changes. Mild marrow edema is seen in the hallux sesamoids. Differential   considerations include degenerative change, early osteonecrosis, and   sesamoiditis. XR FOOT LEFT (MIN 3 VIEWS)   Final Result   No fracture, cortical erosion, or periosteal reaction are identified.              Discharge Medications:   Current Discharge Medication List      START taking these medications    Details   W[facility] Dress[facility] GEL gel Apply 1 each topically daily  Qty: 44 mL, Refills: 0      cephALEXin (KEFLEX) 500 MG capsule Take 1 capsule by mouth 4 times daily for 10 days  Qty: 40 capsule, Refills: 0      saccharomyces boulardii (FLORASTOR) 250 MG capsule Take 1 capsule by mouth 2 times daily for 20 days  Qty: 40 capsule, Refills: 0           Current Discharge Medication List        Current Discharge Medication List      CONTINUE these medications which have NOT CHANGED    Details   empagliflozin (JARDIANCE) 25 MG tablet Take 25 mg by mouth daily      aspirin EC 81 MG EC tablet Take 81 mg by mouth daily      metFORMIN (GLUCOPHAGE) 1000 MG tablet Take by mouth See Admin Instructions 500 mg with Breakfast and 1000 mg with Dinner  Refills: 3      lisinopril (PRINIVIL;ZESTRIL) 5 MG tablet Take 5 mg by mouth daily   Refills: 3      atorvastatin (LIPITOR) 10 MG tablet Take 10 mg by mouth daily   Refills: 3           Current Discharge Medication List          Follow-up appointments:  one week    Provider Follow-up:    pcp    Condition at Discharge:  Stable    The patient was seen and examined on day of discharge and this discharge summary is in conjunction with any daily progress note from day of discharge. Time Spent on discharge is 1 hour  in the examination, evaluation, counseling and review of medications and discharge plan. Signed:    Esteban Yoo DO   3/2/2022      Thank you [address] [place] [address] for the opportunity to be involved in this patient's care. If you have any questions or concerns please feel free to contact me at [phone].

## 2022-03-02 NOTE — PLAN OF CARE
Problem: Falls - Risk of:  Goal: Will remain free from falls  Description: Will remain free from falls  Outcome: Ongoing  Note: Patient will remain free from falls  Goal: Absence of physical injury  Description: Absence of physical injury  Outcome: Ongoing  Note: Patient will be absent of physical injury     Problem: Pain:  Goal: Pain level will decrease  Description: Pain level will decrease  Outcome: Ongoing  Note: Patient will continue to have no pain  Goal: Control of acute pain  Description: Control of acute pain  Outcome: Ongoing  Note: Patient will continue to have no pain  Goal: Control of chronic pain  Description: Control of chronic pain  Outcome: Ongoing  Note: Patient will continue to have no pain

## 2022-03-02 NOTE — PLAN OF CARE
Problem: Falls - Risk of:  Goal: Will remain free from falls  Description: Will remain free from falls  3/2/2022 1332 by Coral Riley RN  Outcome: Ongoing  Note: Fall risk assessment completed. Fall precautions in place. Call light within reach. Pt educated on calling for assistance before getting up. Walkway free of clutter. Will continue to monitor. Electronically signed by Coral Riley RN on 3/2/2022 at 1:33 PM.     Problem: Pain:  Goal: Pain level will decrease  Description: Pain level will decrease  3/2/2022 1332 by Coral Riley RN  Outcome: Ongoing  Note: Educated patient on pain management. Will assess patients pain level per unit protocol, and provide pain management measures as needed.    Electronically signed by Coral Riley RN on 3/2/2022 at 1:33 PM

## 2022-03-02 NOTE — PROGRESS NOTES
Patient resting in bed at this time, A/Ox4, vital signs stable, has no complaints. IV abx infusing, dressing to left foot dry and intact, will continue to monitor.

## 2022-03-02 NOTE — PROGRESS NOTES
Department of Podiatry Progress Note  Marcelo Barry DPM Attending     CHIEF COMPLAINT:  Ulceration of LEFT sub 1st MTPJ with extendning cellulitis to LEFT calf    HISTORY OF PRESENT ILLNESS:                The patient is a 56 y.o. male with significant past medical history of Diabetes with peripheral neuropathy who is consulted for open wound of LEFT sub 1st met-phalangeal joint of approximately 1 month's duration. Patient relates he began a work-out routine and noted blister to LEFT plantar foot that worsened to point of drainage, chills and malaise over last 3 days    Now much better resolve of malaise, afebrile    Past Medical History:        Diagnosis Date    Cellulitis     Diabetes mellitus ([address]      Past Surgical History:    History reviewed. No pertinent surgical history. Allergies:     Testosterone     REVIEW OF SYSTEMS:    CONSTITUTIONAL:  Resolved chills  INTEGUMENT/BREAST:  positive for skin color change, changes in lesion and changes in hair  ENDOCRINE:  positive for diabetic symptoms including neither foot ulcerations nor skin dryness nor neuropathy  MUSCULOSKELETAL:  positive for  joint swelling and decreased range of motion  NEUROLOGICAL:  positive for gait problems and numbness    PHYSICAL EXAM:      Vitals:    BP (!) 158/106   Pulse 83   Temp 98.2 °F (36.8 °C) (Oral)   Resp 18   Ht 6' (1.829 m)   Wt 229 lb 0.9 oz (103.9 kg)   SpO2 98%   BMI 31.07 kg/m²     LABS:   Recent Labs     02/27/22 2123 03/01/22  0543   WBC 14.0* 9.3   HGB 15.3 13.8   HCT 44.0 39.0*    145     Recent Labs     03/01/22  0543   *   K 3.8      CO2 16*   BUN 13   CREATININE 0.6*     Recent Labs     02/27/22 2123   PROT 7.0       LOWER EXTREMITY EXAMINATION   SKIN:    LEFT plantar foot with FULL thickness ulceration of 2.3 x 2.6 cm with depth of sub Q exposure with streaming pink erythema of dorsal foot to mid-calf.  No active drainage, no purulence        NEUROLOGIC:    Pain sensation isnot significantly changed Bilateral.  Light touch is decreasedLeft. positive history of paresthesia Bilateral. negativehistory of burning Bilateral. Deep tendon reflexes are 2 to include patellar and achilles Bilateral. [place]--Melyssa 5.07 monofilament sensitivity is abnormal 2 to 5 spots tested Bilateral.    VASCULAR:    bilaterally Dorsalis pedis is 2. bilaterally Posterior tibial pulse is 2. 1+ edema left. none Varicosities bilaterally. MUSCULOSKELETAL:  Reading is antalgic due to open wound of LEFT plantar foot. Muscle strength is 5/5 to all groups tested to include dorsiflexion, plantarflexion, inversion, eversion, and digital Bilateral . The ranges of motion of all joints tested from ankle distal is decreased there is no crepitus noted Left. Radiographs: LEFT  No acute fracture or dislocation is demonstrated.  No cortical erosion or   periosteal reaction is seen. Tracy Plate are mild degenerative changes along the   midfoot.  No collapse of the talus or calcaneus.  There are no radiopaque   foreign bodies.  There is no soft tissue emphysema.           Impression   No fracture, cortical erosion, or periosteal reaction are identified.             MRI  No evidence of acute osteomyelitis or drainable abscess.       Subtle soft tissue defect is seen at the plantar aspect of the 1st MTP joints.       Mild 1st MTP joint degenerative changes.       Mild marrow edema is seen in the hallux sesamoids.  Differential   considerations include degenerative change, early osteonecrosis, and   sesamoiditis             IMPRESSION/RECOMMENDATIONS    1. Cellulitis of LEFT leg attributed to open wound of plantar foot  +Afebrile with absence of leukocytosis, greater decrease to erythema as marked 2/28    2. Diabetic Ulcer with infection, due to neuropathy  MUGS 3 ulcer LEFT  plantar foot  +Unasyn / Vanc    3.  No soft-tissue gas    MRI NEGATIVE for abscess or osteomyelitis, continued IV antibiotic , concur with anticipated discharge on ORAL antibiotics with follow up Tuesday as OUT patient    Please continue [address] [employer] applications daily    Thank you for the opportunity to take part in the patient's care.     Nile Olivier DPM  [facility]  [phone]

## 2022-03-04 LAB — CULTURE, BLOOD 2: NORMAL

## 2022-03-05 LAB — BLOOD CULTURE, ROUTINE: NORMAL

## 2023-08-12 ENCOUNTER — APPOINTMENT (OUTPATIENT)
Dept: GENERAL RADIOLOGY | Age: 58
DRG: 638 | End: 2023-08-12
Payer: COMMERCIAL

## 2023-08-12 ENCOUNTER — HOSPITAL ENCOUNTER (INPATIENT)
Age: 58
LOS: 3 days | Discharge: HOME | DRG: 638 | End: 2023-08-15
Attending: EMERGENCY MEDICINE | Admitting: ANESTHESIOLOGY
Payer: COMMERCIAL

## 2023-08-12 DIAGNOSIS — E11.628 DIABETIC INFECTION OF RIGHT FOOT (HCC): Primary | ICD-10-CM

## 2023-08-12 DIAGNOSIS — L08.9 DIABETIC INFECTION OF RIGHT FOOT (HCC): Primary | ICD-10-CM

## 2023-08-12 PROBLEM — T14.8XXA WOUND INFECTION: Status: ACTIVE | Noted: 2023-08-12

## 2023-08-12 LAB
ALBUMIN SERPL-MCNC: 3.9 G/DL (ref 3.4–5)
ALBUMIN/GLOB SERPL: 1.2 {RATIO} (ref 1.1–2.2)
ALP SERPL-CCNC: 110 U/L (ref 40–129)
ALT SERPL-CCNC: 13 U/L (ref 10–40)
ANION GAP SERPL CALCULATED.3IONS-SCNC: 13 MMOL/L (ref 3–16)
AST SERPL-CCNC: 13 U/L (ref 15–37)
BASOPHILS # BLD: 0 K/UL (ref 0–0.2)
BASOPHILS NFR BLD: 0.3 %
BILIRUB SERPL-MCNC: 0.6 MG/DL (ref 0–1)
BUN SERPL-MCNC: 14 MG/DL (ref 7–20)
CALCIUM SERPL-MCNC: 8.9 MG/DL (ref 8.3–10.6)
CHLORIDE SERPL-SCNC: 100 MMOL/L (ref 99–110)
CO2 SERPL-SCNC: 20 MMOL/L (ref 21–32)
CREAT SERPL-MCNC: 0.8 MG/DL (ref 0.9–1.3)
DEPRECATED RDW RBC AUTO: 12.8 % (ref 12.4–15.4)
EOSINOPHIL # BLD: 0.1 K/UL (ref 0–0.6)
EOSINOPHIL NFR BLD: 0.6 %
GFR SERPLBLD CREATININE-BSD FMLA CKD-EPI: >60 ML/MIN/{1.73_M2}
GLUCOSE BLD-MCNC: 125 MG/DL (ref 70–99)
GLUCOSE SERPL-MCNC: 183 MG/DL (ref 70–99)
HCT VFR BLD AUTO: 40.2 % (ref 40.5–52.5)
HGB BLD-MCNC: 14.6 G/DL (ref 13.5–17.5)
LACTATE BLDV-SCNC: 1.1 MMOL/L (ref 0.4–1.9)
LYMPHOCYTES # BLD: 1.1 K/UL (ref 1–5.1)
LYMPHOCYTES NFR BLD: 9.7 %
MCH RBC QN AUTO: 31.7 PG (ref 26–34)
MCHC RBC AUTO-ENTMCNC: 36.3 G/DL (ref 31–36)
MCV RBC AUTO: 87.1 FL (ref 80–100)
MONOCYTES # BLD: 1 K/UL (ref 0–1.3)
MONOCYTES NFR BLD: 8.9 %
NEUTROPHILS # BLD: 8.8 K/UL (ref 1.7–7.7)
NEUTROPHILS NFR BLD: 80.5 %
PERFORMED ON: ABNORMAL
PLATELET # BLD AUTO: 212 K/UL (ref 135–450)
PMV BLD AUTO: 7.5 FL (ref 5–10.5)
POTASSIUM SERPL-SCNC: 3.6 MMOL/L (ref 3.5–5.1)
PROT SERPL-MCNC: 7.2 G/DL (ref 6.4–8.2)
RBC # BLD AUTO: 4.61 M/UL (ref 4.2–5.9)
SODIUM SERPL-SCNC: 133 MMOL/L (ref 136–145)
WBC # BLD AUTO: 10.9 K/UL (ref 4–11)

## 2023-08-12 PROCEDURE — 87040 BLOOD CULTURE FOR BACTERIA: CPT

## 2023-08-12 PROCEDURE — 85025 COMPLETE CBC W/AUTO DIFF WBC: CPT

## 2023-08-12 PROCEDURE — 6360000002 HC RX W HCPCS: Performed by: EMERGENCY MEDICINE

## 2023-08-12 PROCEDURE — 87077 CULTURE AEROBIC IDENTIFY: CPT

## 2023-08-12 PROCEDURE — 83605 ASSAY OF LACTIC ACID: CPT

## 2023-08-12 PROCEDURE — 87205 SMEAR GRAM STAIN: CPT

## 2023-08-12 PROCEDURE — 1200000000 HC SEMI PRIVATE

## 2023-08-12 PROCEDURE — 2580000003 HC RX 258: Performed by: ANESTHESIOLOGY

## 2023-08-12 PROCEDURE — 87070 CULTURE OTHR SPECIMN AEROBIC: CPT

## 2023-08-12 PROCEDURE — 82607 VITAMIN B-12: CPT

## 2023-08-12 PROCEDURE — 2580000003 HC RX 258: Performed by: EMERGENCY MEDICINE

## 2023-08-12 PROCEDURE — 96365 THER/PROPH/DIAG IV INF INIT: CPT

## 2023-08-12 PROCEDURE — 82746 ASSAY OF FOLIC ACID SERUM: CPT

## 2023-08-12 PROCEDURE — 73630 X-RAY EXAM OF FOOT: CPT

## 2023-08-12 PROCEDURE — 87186 SC STD MICRODIL/AGAR DIL: CPT

## 2023-08-12 PROCEDURE — 80053 COMPREHEN METABOLIC PANEL: CPT

## 2023-08-12 PROCEDURE — 99285 EMERGENCY DEPT VISIT HI MDM: CPT

## 2023-08-12 RX ORDER — ATORVASTATIN CALCIUM 10 MG/1
10 TABLET, FILM COATED ORAL DAILY
Status: DISCONTINUED | OUTPATIENT
Start: 2023-08-13 | End: 2023-08-15 | Stop reason: HOSPADM

## 2023-08-12 RX ORDER — ASPIRIN 81 MG/1
81 TABLET ORAL DAILY
Status: DISCONTINUED | OUTPATIENT
Start: 2023-08-13 | End: 2023-08-15 | Stop reason: HOSPADM

## 2023-08-12 RX ORDER — INSULIN LISPRO 100 [IU]/ML
0-4 INJECTION, SOLUTION INTRAVENOUS; SUBCUTANEOUS NIGHTLY
Status: DISCONTINUED | OUTPATIENT
Start: 2023-08-12 | End: 2023-08-15 | Stop reason: HOSPADM

## 2023-08-12 RX ORDER — ONDANSETRON 2 MG/ML
4 INJECTION INTRAMUSCULAR; INTRAVENOUS EVERY 6 HOURS PRN
Status: DISCONTINUED | OUTPATIENT
Start: 2023-08-12 | End: 2023-08-15 | Stop reason: HOSPADM

## 2023-08-12 RX ORDER — SODIUM CHLORIDE 0.9 % (FLUSH) 0.9 %
5-40 SYRINGE (ML) INJECTION PRN
Status: DISCONTINUED | OUTPATIENT
Start: 2023-08-12 | End: 2023-08-15 | Stop reason: HOSPADM

## 2023-08-12 RX ORDER — ACETAMINOPHEN 650 MG/1
650 SUPPOSITORY RECTAL EVERY 6 HOURS PRN
Status: DISCONTINUED | OUTPATIENT
Start: 2023-08-12 | End: 2023-08-15 | Stop reason: HOSPADM

## 2023-08-12 RX ORDER — DEXTROSE MONOHYDRATE 100 MG/ML
INJECTION, SOLUTION INTRAVENOUS CONTINUOUS PRN
Status: DISCONTINUED | OUTPATIENT
Start: 2023-08-12 | End: 2023-08-15 | Stop reason: HOSPADM

## 2023-08-12 RX ORDER — LISINOPRIL 5 MG/1
5 TABLET ORAL DAILY
Status: DISCONTINUED | OUTPATIENT
Start: 2023-08-13 | End: 2023-08-15 | Stop reason: HOSPADM

## 2023-08-12 RX ORDER — ACETAMINOPHEN 325 MG/1
650 TABLET ORAL EVERY 6 HOURS PRN
Status: DISCONTINUED | OUTPATIENT
Start: 2023-08-12 | End: 2023-08-15 | Stop reason: HOSPADM

## 2023-08-12 RX ORDER — POLYETHYLENE GLYCOL 3350 17 G/17G
17 POWDER, FOR SOLUTION ORAL DAILY PRN
Status: DISCONTINUED | OUTPATIENT
Start: 2023-08-12 | End: 2023-08-15 | Stop reason: HOSPADM

## 2023-08-12 RX ORDER — SODIUM CHLORIDE 0.9 % (FLUSH) 0.9 %
5-40 SYRINGE (ML) INJECTION EVERY 12 HOURS SCHEDULED
Status: DISCONTINUED | OUTPATIENT
Start: 2023-08-12 | End: 2023-08-15 | Stop reason: HOSPADM

## 2023-08-12 RX ORDER — SODIUM CHLORIDE 9 MG/ML
INJECTION, SOLUTION INTRAVENOUS CONTINUOUS
Status: ACTIVE | OUTPATIENT
Start: 2023-08-12 | End: 2023-08-13

## 2023-08-12 RX ORDER — INSULIN LISPRO 100 [IU]/ML
0-8 INJECTION, SOLUTION INTRAVENOUS; SUBCUTANEOUS
Status: DISCONTINUED | OUTPATIENT
Start: 2023-08-13 | End: 2023-08-15 | Stop reason: HOSPADM

## 2023-08-12 RX ORDER — ONDANSETRON 4 MG/1
4 TABLET, ORALLY DISINTEGRATING ORAL EVERY 8 HOURS PRN
Status: DISCONTINUED | OUTPATIENT
Start: 2023-08-12 | End: 2023-08-15 | Stop reason: HOSPADM

## 2023-08-12 RX ORDER — SODIUM CHLORIDE 9 MG/ML
INJECTION, SOLUTION INTRAVENOUS PRN
Status: DISCONTINUED | OUTPATIENT
Start: 2023-08-12 | End: 2023-08-15 | Stop reason: HOSPADM

## 2023-08-12 RX ADMIN — SODIUM CHLORIDE: 9 INJECTION, SOLUTION INTRAVENOUS at 22:50

## 2023-08-12 RX ADMIN — VANCOMYCIN HYDROCHLORIDE 1500 MG: 1.5 INJECTION, POWDER, LYOPHILIZED, FOR SOLUTION INTRAVENOUS at 20:35

## 2023-08-12 RX ADMIN — CEFEPIME 2000 MG: 2 INJECTION, POWDER, FOR SOLUTION INTRAVENOUS at 19:28

## 2023-08-12 RX ADMIN — Medication 10 ML: at 22:50

## 2023-08-12 ASSESSMENT — PAIN SCALES - GENERAL
PAINLEVEL_OUTOF10: 0
PAINLEVEL_OUTOF10: 0

## 2023-08-12 ASSESSMENT — LIFESTYLE VARIABLES
HOW MANY STANDARD DRINKS CONTAINING ALCOHOL DO YOU HAVE ON A TYPICAL DAY: PATIENT DOES NOT DRINK
HOW OFTEN DO YOU HAVE A DRINK CONTAINING ALCOHOL: NEVER

## 2023-08-12 ASSESSMENT — PAIN - FUNCTIONAL ASSESSMENT: PAIN_FUNCTIONAL_ASSESSMENT: NONE - DENIES PAIN

## 2023-08-12 NOTE — CONSULTS
Clinical Pharmacy Note  Vancomycin Consult    Pharmacy consult received for one-time dose of vancomycin in the Emergency Department per Dr. Mary Jo Reaves    Ht Readings from Last 1 Encounters:   08/12/23 6' (1.829 m)        Wt Readings from Last 1 Encounters:   08/12/23 235 lb 0.2 oz (106.6 kg)         Assessment/Plan:  Vancomycin 1500 mg x 1 in ED. If Vancomycin is to continue on admission and pharmacy is to manage dosing, please re-consult with admission orders.        Domenico Wild

## 2023-08-12 NOTE — ED PROVIDER NOTES
[facility]  eMERGENCY dEPARTMENT eNCOUnter      Pt Name: Jacki Sanchez  MRN: [id number]  [facility] 1965  Date of evaluation: 8/12/2023  Provider: Alejandra Rojas MD    [address]       Chief Complaint   Patient presents with    Wound Infection     Right great toe, redness spreading to his leg, possible celllulitis         CRITICAL CARE TIME   Total Critical Care time was 0 minutes, excluding separately reportable procedures. There was a high probability of clinically significant/life threatening deterioration in the patient's condition which required my urgent intervention. HISTORY OF PRESENT ILLNESS  (Location/Symptom, Timing/Onset, Context/Setting, Quality, Duration, Modifying Factors, Severity.)   History From: Patient  Limitations to history : None    Jacki Sanchez is a 66 y.o. male who presents to the emergency department complaining of redness and swelling to his right great toe and lower leg. Patient is a diabetic. He has had previous episodes of cellulitis. He states he developed some redness in his right great toe about 2 weeks ago. He saw his primary care provider on the second. He states he was put on an oral antibiotic and some type of cream and it seemed to be getting better. He states over the last 2 days he has developed some increased swelling in his right lower leg with some increased redness in his right lower leg and great toe. He states it does not really hurt, but he has neuropathy. He denies any fever body aches or chills at home. No history of trauma/injury. Nursing Notes were reviewed and I agree.       SCREENINGS        [place] Coma Scale  Eye Opening: Spontaneous  Best Verbal Response: Oriented  Best Motor Response: Obeys commands  Emily Coma Scale Score: 15                WA Assessment  BP: (!) 144/74  Pulse: 93           REVIEW OF SYSTEMS    (2-9 systems for level 4, 10 or more for level 5)     HEENT:

## 2023-08-12 NOTE — DISCHARGE INSTRUCTIONS
Elevate leg to help with swelling. Use warm compresses every few hours to help with swelling and reabsorption of hematoma. Tylenol every 6 hours as needed for pain. Follow-up as discussed for venous Doppler. Follow-up with your primary care physician after your Doppler study.

## 2023-08-13 LAB
ANION GAP SERPL CALCULATED.3IONS-SCNC: 11 MMOL/L (ref 3–16)
BASOPHILS # BLD: 0 K/UL (ref 0–0.2)
BASOPHILS NFR BLD: 0.4 %
BUN SERPL-MCNC: 11 MG/DL (ref 7–20)
CALCIUM SERPL-MCNC: 8.5 MG/DL (ref 8.3–10.6)
CHLORIDE SERPL-SCNC: 107 MMOL/L (ref 99–110)
CO2 SERPL-SCNC: 23 MMOL/L (ref 21–32)
CREAT SERPL-MCNC: 0.6 MG/DL (ref 0.9–1.3)
DEPRECATED RDW RBC AUTO: 13.1 % (ref 12.4–15.4)
EOSINOPHIL # BLD: 0.1 K/UL (ref 0–0.6)
EOSINOPHIL NFR BLD: 1.7 %
FOLATE SERPL-MCNC: 16.44 NG/ML (ref 4.78–24.2)
GFR SERPLBLD CREATININE-BSD FMLA CKD-EPI: >60 ML/MIN/{1.73_M2}
GLUCOSE BLD-MCNC: 104 MG/DL (ref 70–99)
GLUCOSE BLD-MCNC: 105 MG/DL (ref 70–99)
GLUCOSE BLD-MCNC: 139 MG/DL (ref 70–99)
GLUCOSE BLD-MCNC: 150 MG/DL (ref 70–99)
GLUCOSE SERPL-MCNC: 117 MG/DL (ref 70–99)
HCT VFR BLD AUTO: 37.1 % (ref 40.5–52.5)
HGB BLD-MCNC: 13.1 G/DL (ref 13.5–17.5)
LYMPHOCYTES # BLD: 1.3 K/UL (ref 1–5.1)
LYMPHOCYTES NFR BLD: 20.4 %
MAGNESIUM SERPL-MCNC: 2 MG/DL (ref 1.8–2.4)
MCH RBC QN AUTO: 30.9 PG (ref 26–34)
MCHC RBC AUTO-ENTMCNC: 35.4 G/DL (ref 31–36)
MCV RBC AUTO: 87.3 FL (ref 80–100)
MONOCYTES # BLD: 0.8 K/UL (ref 0–1.3)
MONOCYTES NFR BLD: 13.4 %
NEUTROPHILS # BLD: 4 K/UL (ref 1.7–7.7)
NEUTROPHILS NFR BLD: 64.1 %
PERFORMED ON: ABNORMAL
PLATELET # BLD AUTO: 196 K/UL (ref 135–450)
PMV BLD AUTO: 7.6 FL (ref 5–10.5)
POTASSIUM SERPL-SCNC: 3.5 MMOL/L (ref 3.5–5.1)
RBC # BLD AUTO: 4.25 M/UL (ref 4.2–5.9)
SODIUM SERPL-SCNC: 141 MMOL/L (ref 136–145)
VIT B12 SERPL-MCNC: 298 PG/ML (ref 211–911)
WBC # BLD AUTO: 6.2 K/UL (ref 4–11)

## 2023-08-13 PROCEDURE — 2580000003 HC RX 258: Performed by: ANESTHESIOLOGY

## 2023-08-13 PROCEDURE — 6360000002 HC RX W HCPCS: Performed by: INTERNAL MEDICINE

## 2023-08-13 PROCEDURE — 80048 BASIC METABOLIC PNL TOTAL CA: CPT

## 2023-08-13 PROCEDURE — 6360000002 HC RX W HCPCS: Performed by: ANESTHESIOLOGY

## 2023-08-13 PROCEDURE — 6370000000 HC RX 637 (ALT 250 FOR IP): Performed by: ANESTHESIOLOGY

## 2023-08-13 PROCEDURE — 85025 COMPLETE CBC W/AUTO DIFF WBC: CPT

## 2023-08-13 PROCEDURE — 6370000000 HC RX 637 (ALT 250 FOR IP): Performed by: PODIATRIST

## 2023-08-13 PROCEDURE — 83735 ASSAY OF MAGNESIUM: CPT

## 2023-08-13 PROCEDURE — 1200000000 HC SEMI PRIVATE

## 2023-08-13 RX ORDER — CYANOCOBALAMIN 1000 UG/ML
1000 INJECTION, SOLUTION INTRAMUSCULAR; SUBCUTANEOUS DAILY
Status: DISCONTINUED | OUTPATIENT
Start: 2023-08-13 | End: 2023-08-15 | Stop reason: HOSPADM

## 2023-08-13 RX ORDER — ENOXAPARIN SODIUM 100 MG/ML
30 INJECTION SUBCUTANEOUS 2 TIMES DAILY
Status: DISCONTINUED | OUTPATIENT
Start: 2023-08-13 | End: 2023-08-15 | Stop reason: HOSPADM

## 2023-08-13 RX ORDER — ENOXAPARIN SODIUM 100 MG/ML
40 INJECTION SUBCUTANEOUS DAILY
Status: DISCONTINUED | OUTPATIENT
Start: 2023-08-13 | End: 2023-08-13 | Stop reason: DRUGHIGH

## 2023-08-13 RX ADMIN — CYANOCOBALAMIN 1000 MCG: 1000 INJECTION, SOLUTION INTRAMUSCULAR; SUBCUTANEOUS at 18:11

## 2023-08-13 RX ADMIN — Medication: at 12:34

## 2023-08-13 RX ADMIN — CEFEPIME 2000 MG: 2 INJECTION, POWDER, FOR SOLUTION INTRAVENOUS at 04:47

## 2023-08-13 RX ADMIN — LISINOPRIL 5 MG: 5 TABLET ORAL at 09:28

## 2023-08-13 RX ADMIN — CEFEPIME 2000 MG: 2 INJECTION, POWDER, FOR SOLUTION INTRAVENOUS at 20:27

## 2023-08-13 RX ADMIN — ATORVASTATIN CALCIUM 10 MG: 10 TABLET, FILM COATED ORAL at 09:28

## 2023-08-13 RX ADMIN — CEFEPIME 2000 MG: 2 INJECTION, POWDER, FOR SOLUTION INTRAVENOUS at 12:33

## 2023-08-13 RX ADMIN — VANCOMYCIN HYDROCHLORIDE 1500 MG: 1.5 INJECTION, POWDER, LYOPHILIZED, FOR SOLUTION INTRAVENOUS at 09:33

## 2023-08-13 RX ADMIN — ENOXAPARIN SODIUM 30 MG: 100 INJECTION SUBCUTANEOUS at 20:28

## 2023-08-13 RX ADMIN — ASPIRIN 81 MG: 81 TABLET, COATED ORAL at 09:28

## 2023-08-13 RX ADMIN — ENOXAPARIN SODIUM 30 MG: 100 INJECTION SUBCUTANEOUS at 09:28

## 2023-08-13 RX ADMIN — VANCOMYCIN HYDROCHLORIDE 1500 MG: 1.5 INJECTION, POWDER, LYOPHILIZED, FOR SOLUTION INTRAVENOUS at 20:36

## 2023-08-13 NOTE — PROGRESS NOTES
Patient on chronic metformin therapy, which can decrease absorption of vitamin B12. B12 level 298 pg/mL, replacement goal > 400 pg/mL. Supplement with B12 1000 mcg IM daily x 5 days.     Luisa Patel MD

## 2023-08-13 NOTE — PROGRESS NOTES
4 Eyes Skin Assessment     NAME:  Merari Olmedo  YOB: 1965  MEDICAL RECORD NUMBER:  [id number]    The patient is being assessed for  Admission    I agree that at least one RN has performed a thorough Head to Toe Skin Assessment on the patient. ALL assessment sites listed below have been assessed. Areas assessed by both nurses:    Head, Face, Ears, Shoulders, Back, Chest, Arms, Elbows, Hands, Sacrum. Buttock, Coccyx, Ischium, Legs. Feet and Heels, and Under Medical Devices         Does the Patient have a Wound? Yes wound(s) were present on assessment.  LDA wound assessment was Initiated and completed by RN       Yony Prevention initiated by RN: Yes  Wound Care Orders initiated by RN: no    Pressure Injury (Stage 3,4, Unstageable, DTI, NWPT, and Complex wounds) if present, place Wound referral order by RN under : No    New Ostomies, if present place, Ostomy referral order under : No     Nurse 1 eSignature: Electronically signed by Shannon Encinas RN on 8/12/23 at 11:56 PM EDT    **SHARE this note so that the co-signing nurse can place an eSignature**    Nurse 2 eSignature: Electronically signed by Oswald Anderson RN on 2/97/48 at 12:08 AM EDT

## 2023-08-13 NOTE — PROGRESS NOTES
Clinical Pharmacy Note  Subcutaneous Anticoagulant Adjustment     Enoxaparin has been adjusted to 30 mg BID based on [facility] policy. Recent Labs     08/12/23  1819 08/13/23  0435   CREATININE 0.8* 0.6*     Recent Labs     08/13/23  0435   HGB 13.1*   HCT 37.1*        Estimated Creatinine Clearance: 172 mL/min (A) (based on SCr of 0.6 mg/dL (L)). Pharmacist Review of Appropriate Use and Automatic Dose Adjustment of Subcutaneous Anticoagulants (Adult)    The guidance below is to provide initial recommendations for dosing. If recommended dose does not align well with patient's current clinical picture, communications with the care team will occur to determine most appropriate medication and dose. TABLE 1. ENOXAPARIN ROUTINE PROPHYLAXIS DOSING (Medically ill, routine surgery)   Patient Weight (kg)     50.9 and below 51 - 100.9 101 - 150.9 151 - 174.9 175 or greater         Estimated CrCl  (ml/min) 30 or greater   30 mg SUBQ daily   40 mg SUBQ daily 30 mg SUBQ BID  40 mg SUBQ BID 60mg SUBQ BID      15-29 UFH 5000 units SUBQ BID   30 mg SUBQ daily 30 mg SUBQ daily 40 mg SUBQ daily   60 mg SUBQ daily      Less than 15 or Dialysis UFH 5000 units SUBQ BID   UFH 5000 units SUBQ TID UFH 7500 units SUBQ TID       TABLE 2. ENOXAPARIN TREATMENT DOSING   (Based on 1mg/kg BID for DVT/PE/AFib)   Patient Weight (kg)     50.9 and below .9 151-189.9 190 or greater         Estimated CrCl  (ml/min) 30 or greater Recommend [facility] standardized UFH infusion, apixaban or rivaroxaban 1mg/kg SUBQ BID 1mg/kg SUBQ BID if anti-Xa levels are feasible per institution. Alternatively,  recommend switch to [facility] standardized UFH infusion     Recommend switch to [facility] standardized UFH infusion. 15-29 Recommend [facility] standardized UFH infusion or apixaban 1mg/kg SUBQ daily Recommend switch to [facility] standardized UFH infusion     Less than 15 or Dialysis Recommend switch to [facility] standardized UFH infusion.      Jerry Saldana, [facility]

## 2023-08-13 NOTE — PLAN OF CARE
Problem: Discharge Planning  Goal: Discharge to home or other facility with appropriate resources  8/13/2023 1033 by Kristi Ramirez RN  Outcome: Progressing  8/12/2023 2352 by Lissy Holcomb RN  Outcome: Progressing  Flowsheets (Taken 8/12/2023 2208)  Discharge to home or other facility with appropriate resources:   Identify barriers to discharge with patient and caregiver   Identify discharge learning needs (meds, wound care, etc)   Refer to discharge planning if patient needs post-hospital services based on physician order or complex needs related to functional status, cognitive ability or social support system   Arrange for needed discharge resources and transportation as appropriate     Problem: Pain  Goal: Verbalizes/displays adequate comfort level or baseline comfort level  8/13/2023 1033 by Kristi Ramirez RN  Outcome: Progressing  8/12/2023 2352 by Lissy Holcomb, RN  Outcome: Progressing     Problem: Infection - Adult  Goal: Absence of infection during hospitalization  8/12/2023 2352 by Lissy Holcomb, RN  Outcome: Progressing     Problem: Infection - Adult  Goal: Absence of fever/infection during anticipated neutropenic period  8/12/2023 2352 by Lissy Holcomb, RN  Outcome: Progressing

## 2023-08-13 NOTE — PROGRESS NOTES
Hospitalist Progress Note  8/13/2023 10:17 AM  Subjective:   Admit Date: 8/12/2023  PCP: Rahat Gonzalez  Status: Inpatient   Interval History: Hospital Day: 2, admitted with diabetic foot infection, progressive pain, erythema and swelling of the right great toe and lower leg for 2 weeks, refractory to 7 days of oral doxycycline and topical mupirocin as outpatient. Initiated on cefepime and vancomycin. Co-morbidities include type 2 diabetes and hypertension. Podiatry evaluation by Dr. Lawanda Hoffman, who also saw him with similar infection in March 2022. Diet: controlled carbohydrate   Right antecubital peripheral IV (8/12, day #2)  Medications:   Sodium chloride at 75 ml/hr  aspirin   81 mg Oral Daily   atorvastatin  10 mg Oral Daily   lisinopril  5 mg Oral Daily   vancomycin  15 mg/kg IntraVENous Q12H (8/12, day #2)   insulin lispro SSI  0-8 Units SubCUTAneous TID WC   cefepime  2,000 mg IntraVENous Q8H (8/12, day #2)     Recent Labs     08/12/23  1819 08/13/23  0435   WBC 10.9 6.2   HGB 14.6 13.1*    196   MCV 87.1 87.3     Recent Labs     08/12/23  1819 08/13/23  0435   * 141   K 3.6 3.5    107   CO2 20* 23   BUN 14 11   CREATININE 0.8* 0.6*   GLUCOSE 183* 117*       Recent Labs     08/12/23  1819   AST 13*   ALT 13   BILITOT 0.6   ALKPHOS 110     Lactate (8/12) 1.1 mmol/L  Magnesium (8/13) 2.00 mg/dL    Blood culture x 2 (8/12) pending    POC Glucose:   Recent Labs     08/12/23  2231 08/13/23  0727   POCGLU 125* 104*     Left foot X-ray (8/12) Mild osteoarthritic changes throughout the 1st toe and mild soft tissue swelling throughout the toe with no acute bony abnormality    MRI left foot (3/1/22) No evidence of acute osteomyelitis or drainable abscess. Subtle soft tissue defect is seen at the plantar aspect of the 1st MTP joints. Mild 1st MTP joint degenerative changes. Mild marrow edema is seen in the hallux sesamoids.   Differential considerations include degenerative change, early

## 2023-08-13 NOTE — PROGRESS NOTES
Patient arrives to [facility] via transport. Patient is ambulatory. Assisted to bathroom. Patient is A&Ox4. VSS. Patient assessed. Patient is oriented to room. Bed is locked and in lowest position. Call light provided.       Electronically signed by Cristy Howard RN on 8/12/2023 at 11:55 PM

## 2023-08-13 NOTE — PLAN OF CARE
Problem: Discharge Planning  Goal: Discharge to home or other facility with appropriate resources  Outcome: Progressing  Flowsheets (Taken 8/12/2023 2208)  Discharge to home or other facility with appropriate resources:   Identify barriers to discharge with patient and caregiver   Identify discharge learning needs (meds, wound care, etc)   Refer to discharge planning if patient needs post-hospital services based on physician order or complex needs related to functional status, cognitive ability or social support system   Arrange for needed discharge resources and transportation as appropriate     Problem: Pain  Goal: Verbalizes/displays adequate comfort level or baseline comfort level  Outcome: Progressing     Problem: Skin/Tissue Integrity - Adult  Goal: Skin integrity remains intact  Outcome: Progressing  Goal: Incisions, wounds, or drain sites healing without S/S of infection  Outcome: Progressing     Problem: Infection - Adult  Goal: Absence of infection at discharge  Outcome: Progressing  Goal: Absence of infection during hospitalization  Outcome: Progressing  Goal: Absence of fever/infection during anticipated neutropenic period  Outcome: Progressing

## 2023-08-13 NOTE — CONSULTS
Department of Podiatry Consult Note  Alessia Uriarte. Diego Tillman DPM Attending       Reason for Consult:  RIGHT great toe ulceration, diabetic infection  Requesting Physician:  Namrata Tran MD    CHIEF COMPLAINT:  Redness, swelling and drainage of RIGHT great toe spreading to lower RIGHT leg, ankle, worsening over last 24-48 hours, with initial presentation 2 weeks ago for which he saw primary care and was given oral antibiotics and topical cream    HISTORY OF PRESENT ILLNESS:                The patient is a 66 y.o. male with significant past medical history of Diabetes with neuropathy and previous lower extremity ulceration, with  who is consulted for concern of diabetic wound of RIGHT hallux with worsening cellulitis of 24-48 hours.  Initial presentation to primary care with oral antibiotics and topical cream 2 weeks ago    Past Medical History:        Diagnosis Date    Cellulitis     Diabetes mellitus ([address])      Past Surgical History:        Procedure Laterality Date    COLONOSCOPY       Current Medications:    Current Facility-Administered Medications: enoxaparin Sodium (LOVENOX) injection 30 mg, 30 mg, SubCUTAneous, BID  aspirin EC tablet 81 mg, 81 mg, Oral, Daily  atorvastatin (LIPITOR) tablet 10 mg, 10 mg, Oral, Daily  lisinopril (PRINIVIL;ZESTRIL) tablet 5 mg, 5 mg, Oral, Daily  sodium chloride flush 0.9 % injection 5-40 mL, 5-40 mL, IntraVENous, 2 times per day  sodium chloride flush 0.9 % injection 5-40 mL, 5-40 mL, IntraVENous, PRN  0.9 % sodium chloride infusion, , IntraVENous, PRN  ondansetron (ZOFRAN-ODT) disintegrating tablet 4 mg, 4 mg, Oral, Q8H PRN **OR** ondansetron (ZOFRAN) injection 4 mg, 4 mg, IntraVENous, Q6H PRN  polyethylene glycol (GLYCOLAX) packet 17 g, 17 g, Oral, Daily PRN  acetaminophen (TYLENOL) tablet 650 mg, 650 mg, Oral, Q6H PRN **OR** acetaminophen (TYLENOL) suppository 650 mg, 650 mg, Rectal, Q6H PRN  0.9 % sodium chloride infusion, , IntraVENous, Continuous  vancomycin (VANCOCIN) 1,500 mg

## 2023-08-13 NOTE — PROGRESS NOTES
Assessed Patient's Wound to R Great Toe. Patient states he is a diabetic and has pmhx of neuropathy. Patient was out scouting for deer hunting with son today. Patient states it started as a blister and was putting mupirocin topically on it in addition to oral antibiotics but it is getting worse. R great toe is red, swollen and hot. Skin is peeling on all five toes and patient has another wound on bottom of pad of R foot. There is a small area that is open with dried blood and purulent drainage. Toes on R Foot are red also and there is a reddened area that is hot to to the touch to the mid shin area on RLE. RLE is swollen but non pitting. Left area open to air. Upon patients approval marked a line on reddened area on R shin.      Electronically signed by Ruben Garcia RN on 8/13/2023 at 12:08 AM

## 2023-08-14 LAB
ALBUMIN SERPL-MCNC: 3.2 G/DL (ref 3.4–5)
ANION GAP SERPL CALCULATED.3IONS-SCNC: 10 MMOL/L (ref 3–16)
ANISOCYTOSIS BLD QL SMEAR: ABNORMAL
BACTERIA SPEC AEROBE CULT: ABNORMAL
BACTERIA SPEC AEROBE CULT: ABNORMAL
BASOPHILS # BLD: 0 K/UL (ref 0–0.2)
BASOPHILS NFR BLD: 0 %
BUN SERPL-MCNC: 10 MG/DL (ref 7–20)
CALCIUM SERPL-MCNC: 8.5 MG/DL (ref 8.3–10.6)
CHLORIDE SERPL-SCNC: 106 MMOL/L (ref 99–110)
CO2 SERPL-SCNC: 20 MMOL/L (ref 21–32)
CREAT SERPL-MCNC: <0.5 MG/DL (ref 0.9–1.3)
DEPRECATED RDW RBC AUTO: 12.7 % (ref 12.4–15.4)
EOSINOPHIL # BLD: 0.2 K/UL (ref 0–0.6)
EOSINOPHIL NFR BLD: 3 %
GFR SERPLBLD CREATININE-BSD FMLA CKD-EPI: >60 ML/MIN/{1.73_M2}
GLUCOSE BLD-MCNC: 110 MG/DL (ref 70–99)
GLUCOSE BLD-MCNC: 164 MG/DL (ref 70–99)
GLUCOSE BLD-MCNC: 242 MG/DL (ref 70–99)
GLUCOSE SERPL-MCNC: 122 MG/DL (ref 70–99)
GRAM STN SPEC: ABNORMAL
HCT VFR BLD AUTO: 36.4 % (ref 40.5–52.5)
HGB BLD-MCNC: 13.1 G/DL (ref 13.5–17.5)
LYMPHOCYTES # BLD: 1.5 K/UL (ref 1–5.1)
LYMPHOCYTES NFR BLD: 24 %
MAGNESIUM SERPL-MCNC: 2 MG/DL (ref 1.8–2.4)
MCH RBC QN AUTO: 31.4 PG (ref 26–34)
MCHC RBC AUTO-ENTMCNC: 35.9 G/DL (ref 31–36)
MCV RBC AUTO: 87.5 FL (ref 80–100)
MONOCYTES # BLD: 0.2 K/UL (ref 0–1.3)
MONOCYTES NFR BLD: 3 %
NEUTROPHILS # BLD: 3.8 K/UL (ref 1.7–7.7)
NEUTROPHILS NFR BLD: 61 %
NEUTS BAND NFR BLD MANUAL: 7 % (ref 0–7)
ORGANISM: ABNORMAL
ORGANISM: ABNORMAL
PERFORMED ON: ABNORMAL
PHOSPHATE SERPL-MCNC: 2.6 MG/DL (ref 2.5–4.9)
PLATELET # BLD AUTO: 176 K/UL (ref 135–450)
PLATELET BLD QL SMEAR: ADEQUATE
PMV BLD AUTO: 8.3 FL (ref 5–10.5)
POTASSIUM SERPL-SCNC: 3.6 MMOL/L (ref 3.5–5.1)
RBC # BLD AUTO: 4.16 M/UL (ref 4.2–5.9)
SLIDE REVIEW: ABNORMAL
SODIUM SERPL-SCNC: 136 MMOL/L (ref 136–145)
TOXIC GRANULES BLD QL SMEAR: PRESENT
VANCOMYCIN TROUGH SERPL-MCNC: 4.7 UG/ML (ref 10–20)
VARIANT LYMPHS NFR BLD MANUAL: 2 % (ref 0–6)
WBC # BLD AUTO: 5.6 K/UL (ref 4–11)

## 2023-08-14 PROCEDURE — 80069 RENAL FUNCTION PANEL: CPT

## 2023-08-14 PROCEDURE — 6360000002 HC RX W HCPCS: Performed by: INTERNAL MEDICINE

## 2023-08-14 PROCEDURE — 83735 ASSAY OF MAGNESIUM: CPT

## 2023-08-14 PROCEDURE — 6360000002 HC RX W HCPCS: Performed by: HOSPITALIST

## 2023-08-14 PROCEDURE — 2580000003 HC RX 258: Performed by: ANESTHESIOLOGY

## 2023-08-14 PROCEDURE — 36415 COLL VENOUS BLD VENIPUNCTURE: CPT

## 2023-08-14 PROCEDURE — 1200000000 HC SEMI PRIVATE

## 2023-08-14 PROCEDURE — 85025 COMPLETE CBC W/AUTO DIFF WBC: CPT

## 2023-08-14 PROCEDURE — 6360000002 HC RX W HCPCS: Performed by: ANESTHESIOLOGY

## 2023-08-14 PROCEDURE — 80202 ASSAY OF VANCOMYCIN: CPT

## 2023-08-14 PROCEDURE — 6370000000 HC RX 637 (ALT 250 FOR IP): Performed by: ANESTHESIOLOGY

## 2023-08-14 PROCEDURE — 94760 N-INVAS EAR/PLS OXIMETRY 1: CPT

## 2023-08-14 RX ORDER — VANCOMYCIN 1.75 G/350ML
1250 INJECTION, SOLUTION INTRAVENOUS EVERY 8 HOURS
Status: DISCONTINUED | OUTPATIENT
Start: 2023-08-14 | End: 2023-08-15

## 2023-08-14 RX ADMIN — SODIUM CHLORIDE: 9 INJECTION, SOLUTION INTRAVENOUS at 17:35

## 2023-08-14 RX ADMIN — ENOXAPARIN SODIUM 30 MG: 100 INJECTION SUBCUTANEOUS at 08:09

## 2023-08-14 RX ADMIN — SODIUM CHLORIDE: 9 INJECTION, SOLUTION INTRAVENOUS at 08:21

## 2023-08-14 RX ADMIN — CYANOCOBALAMIN 1000 MCG: 1000 INJECTION, SOLUTION INTRAMUSCULAR; SUBCUTANEOUS at 08:24

## 2023-08-14 RX ADMIN — CEFEPIME 2000 MG: 2 INJECTION, POWDER, FOR SOLUTION INTRAVENOUS at 13:20

## 2023-08-14 RX ADMIN — CEFEPIME 2000 MG: 2 INJECTION, POWDER, FOR SOLUTION INTRAVENOUS at 20:38

## 2023-08-14 RX ADMIN — LISINOPRIL 5 MG: 5 TABLET ORAL at 08:09

## 2023-08-14 RX ADMIN — ENOXAPARIN SODIUM 30 MG: 100 INJECTION SUBCUTANEOUS at 20:38

## 2023-08-14 RX ADMIN — ATORVASTATIN CALCIUM 10 MG: 10 TABLET, FILM COATED ORAL at 08:09

## 2023-08-14 RX ADMIN — SODIUM CHLORIDE: 9 INJECTION, SOLUTION INTRAVENOUS at 13:19

## 2023-08-14 RX ADMIN — Medication 10 ML: at 17:37

## 2023-08-14 RX ADMIN — ASPIRIN 81 MG: 81 TABLET, COATED ORAL at 08:09

## 2023-08-14 RX ADMIN — CEFEPIME 2000 MG: 2 INJECTION, POWDER, FOR SOLUTION INTRAVENOUS at 04:09

## 2023-08-14 RX ADMIN — Medication 10 ML: at 08:23

## 2023-08-14 RX ADMIN — VANCOMYCIN 1250 MG: 1.75 INJECTION, SOLUTION INTRAVENOUS at 08:23

## 2023-08-14 RX ADMIN — VANCOMYCIN 1250 MG: 1.75 INJECTION, SOLUTION INTRAVENOUS at 17:37

## 2023-08-14 RX ADMIN — Medication: at 08:09

## 2023-08-14 NOTE — PROGRESS NOTES
Pt AOX4 - pt denied n/v, sob, diarrhea,pain - pt denied any further needs - bed in lowest and locked position with bedside table and call light within reach -

## 2023-08-14 NOTE — PROGRESS NOTES
Reviewed discharge and follow up instructions with pt - answered all questions - pt stable w/out s/sx of distress at discharge - wheelchair transportation to the lobby

## 2023-08-14 NOTE — CASE COMMUNICATION
Discharge Planning:     (CM) reviewed the patient's chart to assess needs. Patient's Readmission Risk Score is 7% . Patient's medical insurance is R. Patient's PCP is FISH SALINAS. No needs anticipated, at this time. CM team to follow. Staff to inform CM if additional discharge needs arise.      Electronically signed by Kathy Martin on 8/14/23 at 1:39 PM EDT

## 2023-08-14 NOTE — PLAN OF CARE
Problem: Discharge Planning  Goal: Discharge to home or other facility with appropriate resources  Outcome: Progressing     Problem: Pain  Goal: Verbalizes/displays adequate comfort level or baseline comfort level  Outcome: Progressing     Problem: Skin/Tissue Integrity - Adult  Goal: Skin integrity remains intact  Outcome: Progressing  Goal: Incisions, wounds, or drain sites healing without S/S of infection  Outcome: Progressing     Problem: Infection - Adult  Goal: Absence of infection at discharge  Outcome: Progressing  Goal: Absence of infection during hospitalization  Outcome: Progressing  Goal: Absence of fever/infection during anticipated neutropenic period  Outcome: Progressing

## 2023-08-15 VITALS
RESPIRATION RATE: 16 BRPM | WEIGHT: 232.59 LBS | DIASTOLIC BLOOD PRESSURE: 94 MMHG | BODY MASS INDEX: 31.5 KG/M2 | HEIGHT: 72 IN | HEART RATE: 74 BPM | OXYGEN SATURATION: 98 % | TEMPERATURE: 96.4 F | SYSTOLIC BLOOD PRESSURE: 147 MMHG

## 2023-08-15 LAB
GLUCOSE BLD-MCNC: 129 MG/DL (ref 70–99)
GLUCOSE BLD-MCNC: 131 MG/DL (ref 70–99)
GLUCOSE BLD-MCNC: 169 MG/DL (ref 70–99)
PERFORMED ON: ABNORMAL
VANCOMYCIN SERPL-MCNC: 19 UG/ML

## 2023-08-15 PROCEDURE — 6370000000 HC RX 637 (ALT 250 FOR IP): Performed by: ANESTHESIOLOGY

## 2023-08-15 PROCEDURE — 6360000002 HC RX W HCPCS: Performed by: INTERNAL MEDICINE

## 2023-08-15 PROCEDURE — 2580000003 HC RX 258: Performed by: ANESTHESIOLOGY

## 2023-08-15 PROCEDURE — 6360000002 HC RX W HCPCS: Performed by: ANESTHESIOLOGY

## 2023-08-15 PROCEDURE — 80202 ASSAY OF VANCOMYCIN: CPT

## 2023-08-15 PROCEDURE — 36415 COLL VENOUS BLD VENIPUNCTURE: CPT

## 2023-08-15 PROCEDURE — 6360000002 HC RX W HCPCS: Performed by: HOSPITALIST

## 2023-08-15 RX ORDER — CIPROFLOXACIN 500 MG/1
500 TABLET, FILM COATED ORAL 2 TIMES DAILY
Qty: 20 TABLET | Refills: 0 | Status: SHIPPED | OUTPATIENT
Start: 2023-08-15 | End: 2023-08-25

## 2023-08-15 RX ADMIN — VANCOMYCIN 1250 MG: 1.75 INJECTION, SOLUTION INTRAVENOUS at 00:45

## 2023-08-15 RX ADMIN — Medication 10 ML: at 09:07

## 2023-08-15 RX ADMIN — ENOXAPARIN SODIUM 30 MG: 100 INJECTION SUBCUTANEOUS at 09:12

## 2023-08-15 RX ADMIN — ATORVASTATIN CALCIUM 10 MG: 10 TABLET, FILM COATED ORAL at 09:12

## 2023-08-15 RX ADMIN — LISINOPRIL 5 MG: 5 TABLET ORAL at 09:12

## 2023-08-15 RX ADMIN — ASPIRIN 81 MG: 81 TABLET, COATED ORAL at 09:12

## 2023-08-15 RX ADMIN — CYANOCOBALAMIN 1000 MCG: 1000 INJECTION, SOLUTION INTRAMUSCULAR; SUBCUTANEOUS at 09:12

## 2023-08-15 RX ADMIN — CEFEPIME 2000 MG: 2 INJECTION, POWDER, FOR SOLUTION INTRAVENOUS at 04:51

## 2023-08-15 NOTE — PLAN OF CARE
Problem: Discharge Planning  Goal: Discharge to home or other facility with appropriate resources  8/15/2023 1330 by Juan Antonio Friend RN  Outcome: Completed  8/15/2023 1247 by Juan Antonio Friend RN  Outcome: Progressing     Problem: Pain  Goal: Verbalizes/displays adequate comfort level or baseline comfort level  8/15/2023 1330 by Juan Antonio Friend RN  Outcome: Completed  8/15/2023 1247 by Juan Antonio Friend RN  Outcome: Progressing     Problem: Skin/Tissue Integrity - Adult  Goal: Skin integrity remains intact  8/15/2023 1330 by Juan Antonio Friend RN  Outcome: Completed  8/15/2023 1247 by Juan Antonio Friend RN  Outcome: Progressing  Goal: Incisions, wounds, or drain sites healing without S/S of infection  8/15/2023 1330 by Juan Antonio Friend RN  Outcome: Completed  8/15/2023 1247 by Juan Antonio Friend RN  Outcome: Progressing     Problem: Infection - Adult  Goal: Absence of infection at discharge  8/15/2023 1330 by Juan Antonio Friend RN  Outcome: Completed  8/15/2023 1247 by Juan Antonio Friend RN  Outcome: Progressing  Goal: Absence of infection during hospitalization  8/15/2023 1330 by Juan Antonio Friend RN  Outcome: Completed  8/15/2023 1247 by Juan Antonio Friend RN  Outcome: Progressing  Goal: Absence of fever/infection during anticipated neutropenic period  8/15/2023 1330 by Juan Antonio Friend RN  Outcome: Completed  8/15/2023 1247 by Juan Antonio Friend RN  Outcome: Progressing

## 2023-08-15 NOTE — PLAN OF CARE
Problem: Discharge Planning  Goal: Discharge to home or other facility with appropriate resources  8/14/2023 2243 by Morelia Tomlinson RN  Outcome: Progressing  8/14/2023 1602 by Radha Burnett RN  Outcome: Progressing     Problem: Pain  Goal: Verbalizes/displays adequate comfort level or baseline comfort level  8/14/2023 2243 by Morelia Tomlinson RN  Outcome: Progressing  8/14/2023 1602 by Radha Burnett RN  Outcome: Progressing     Problem: Skin/Tissue Integrity - Adult  Goal: Skin integrity remains intact  8/14/2023 2243 by Morelia Tomlinson RN  Outcome: Progressing  Flowsheets (Taken 8/14/2023 2242)  Skin Integrity Remains Intact: Monitor for areas of redness and/or skin breakdown  8/14/2023 1602 by Radha Burnett RN  Outcome: Progressing  Goal: Incisions, wounds, or drain sites healing without S/S of infection  8/14/2023 2243 by Morelia Tomlinson RN  Outcome: Progressing  8/14/2023 1602 by Radha Burnett RN  Outcome: Progressing     Problem: Infection - Adult  Goal: Absence of infection at discharge  8/14/2023 2243 by Morelia Tomlinson RN  Outcome: Progressing  8/14/2023 1602 by Radha Burnett RN  Outcome: Progressing  Goal: Absence of infection during hospitalization  8/14/2023 2243 by Morelia Tomlinson RN  Outcome: Progressing  8/14/2023 1602 by Radha Burnett RN  Outcome: Progressing  Goal: Absence of fever/infection during anticipated neutropenic period  8/14/2023 2243 by Morelia Tomlinson RN  Outcome: Progressing  8/14/2023 1602 by Radha Burnett RN  Outcome: Progressing

## 2023-08-15 NOTE — CARE COORDINATION
Case Management Discharge Note          Date / Time of Note: 8/15/2023 12:28 PM                  Patient Name: Ingris Jim   YOB: 1965  Diagnosis: Wound infection [T14. 8XXA, L08.9]  Diabetic infection of right foot ([address]) [K36.350, L08.9]   Date / Time: 8/12/2023  5:18 PM    Financial:  Payor: UMR / Plan: UMR / Product Type: *No Product type* /      Pharmacy:    [facility] [address] [place] [id number]  Phone: [phone] Fax: [phone]    [id number] - [place]. Lorenzo Gonsalez [phone] - F [phone]  [address] [place] [address]. [address] [place] [address] Drive [address]  Phone: [phone] Fax: [phone]      DISCHARGE Disposition: Home- No Services Needed      Transportation:  Transportation PLAN for discharge: family   Mode of Transport: Private Car    IMM Completed:   Not Indicated    Additional CM Notes: Discharge Home family support. The Plan for Transition of Care is related to the following treatment goals of Wound infection [T14. 8XXA, L08.9]  Diabetic infection of right foot ([address]) [J54.920, L08.9]    The Patient and/or patient representative Yue Grant and his family were provided with a choice of provider and agrees with the discharge plan Yes    Freedom of choice list was provided with basic dialogue that supports the patient's individualized plan of care/goals and shares the quality data associated with the providers.  Yes    [address]   Case Management Department  Ph: [phone]  Fax: [phone]

## 2023-08-15 NOTE — DISCHARGE INSTR - DIET
Good nutrition is important when healing from an illness, injury, or surgery. Follow any nutrition recommendations given to you during your hospital stay. If you were given an oral nutrition supplement while in the hospital, continue to take this supplement at home. You can take it with meals, in-between meals, and/or before bedtime. These supplements can be purchased at most local grocery stores, pharmacies, and chain [employer]-stores. If you have any questions about your diet or nutrition, call the hospital and ask for the dietitian.     Regular Diet 75 grams of carbs per meal

## 2023-08-15 NOTE — PROGRESS NOTES
Reviewed discharge instructions with pt and family - answered all questions - pt stable w/out s/sx of distress at discharge - wheelchair transportation to the lobby

## 2023-08-16 LAB
BACTERIA BLD CULT ORG #2: NORMAL
BACTERIA BLD CULT: NORMAL

## 2023-08-18 NOTE — DISCHARGE SUMMARY
Hospital Medicine Discharge Summary      Patient ID: Bart Pendleton [id number]     Patient's PCP: Arin SALINAS    Admit Date: 8/12/2023     Discharge Date: 8/15/2023      Admitting Physician: Melia Lawton DO    Discharge Physician: Jeremy Chen MD     Discharge Diagnoses: Active Hospital Problems    Diagnosis Date Noted    Wound infection [T14. 8XXA, L08.9] 08/12/2023         The patient was seen and examined on the day of discharge and this discharge summary is in conjunction with any daily progress note from day of discharge. HOSPITAL COURSE    Patient demographics:  The patient  Bart Pendleton is a 66 y.o. male      Significant past medical history:       Patient Active Problem List   Diagnosis    Cellulitis    Sepsis due to cellulitis ([address])    DM2 (diabetes mellitus, type 2) ([address])    Sepsis ([address])    Cellulitis of foot, left    Uncontrolled type 2 diabetes mellitus with hyperglycemia ([address])    Wound infection            Presenting symptoms:  wound infection     Diagnostic workup:   XR FOOT RIGHT       CONSULTS DURING ADMISSION :   IP CONSULT TO HOSPITALIST  IP CONSULT TO PODIATRY        Patient was diagnosed with:  Diabetic foot infection,   Type 2 Diabetes     Treatment while inpatient:  Bart Pendleton is a 66 y.o. male with past medical history significant for DM type II   Patient  presented  with diabetic Wound infection. Patient was diagnosed with cellulitis of the right foot due to wound on the right big toe. Patient was started on IV antibiotics while in the hospital.  Patient will be discharged on oral antibiotics according to culture and sensitivity. Discharge Condition:  stable      Discharged to:  Home      Activity:   as tolerated: Follow Up: Follow-up with PCP in 1-2 weeks                      Labs:  For convenience and continuity at follow-up the following most recent labs are provided:      CBC:   Lab Results   Component Value Date/Time    WBC

## 2024-04-22 NOTE — PROGRESS NOTES
Chronic illness Department of Podiatry Progress Note      CHIEF COMPLAINT:  Redness, swelling and drainage of RIGHT great toe spreading to lower RIGHT leg, ankle, worsening over last 24-48 hours, with initial presentation 2 weeks ago for which he saw primary care and was given oral antibiotics and topical cream    HISTORY OF PRESENT ILLNESS:                The patient is a 66 y.o. male with significant past medical history of Diabetes with neuropathy and previous lower extremity ulceration, with  who is consulted for concern of diabetic wound of RIGHT hallux with worsening cellulitis of 24-48 hours.  Initial presentation to primary care with oral antibiotics and topical cream 2 weeks ago    Past Medical History:        Diagnosis Date    Cellulitis     Diabetes mellitus ([address])      Past Surgical History:        Procedure Laterality Date    COLONOSCOPY       Current Medications:    Current Facility-Administered Medications: vancomycin (VANCOCIN) 1250 mg in 250 mL IVPB, 1,250 mg, IntraVENous, Q8H  enoxaparin Sodium (LOVENOX) injection 30 mg, 30 mg, SubCUTAneous, BID  collagenase ointment, , Topical, Daily  vancomycin (VANCOCIN) intermittent dosing (placeholder), , Other, RX Placeholder  cyanocobalamin injection 1,000 mcg, 1,000 mcg, IntraMUSCular, Daily  aspirin EC tablet 81 mg, 81 mg, Oral, Daily  atorvastatin (LIPITOR) tablet 10 mg, 10 mg, Oral, Daily  lisinopril (PRINIVIL;ZESTRIL) tablet 5 mg, 5 mg, Oral, Daily  sodium chloride flush 0.9 % injection 5-40 mL, 5-40 mL, IntraVENous, 2 times per day  sodium chloride flush 0.9 % injection 5-40 mL, 5-40 mL, IntraVENous, PRN  0.9 % sodium chloride infusion, , IntraVENous, PRN  ondansetron (ZOFRAN-ODT) disintegrating tablet 4 mg, 4 mg, Oral, Q8H PRN **OR** ondansetron (ZOFRAN) injection 4 mg, 4 mg, IntraVENous, Q6H PRN  polyethylene glycol (GLYCOLAX) packet 17 g, 17 g, Oral, Daily PRN  acetaminophen (TYLENOL) tablet 650 mg, 650 mg, Oral, Q6H PRN **OR** acetaminophen (TYLENOL) suppository 650